# Patient Record
Sex: FEMALE | Race: ASIAN | NOT HISPANIC OR LATINO | Employment: FULL TIME | URBAN - METROPOLITAN AREA
[De-identification: names, ages, dates, MRNs, and addresses within clinical notes are randomized per-mention and may not be internally consistent; named-entity substitution may affect disease eponyms.]

---

## 2023-11-09 ENCOUNTER — TELEPHONE (OUTPATIENT)
Dept: OBSTETRICS AND GYNECOLOGY | Facility: CLINIC | Age: 28
End: 2023-11-09
Payer: OTHER GOVERNMENT

## 2023-11-09 NOTE — TELEPHONE ENCOUNTER
FW: Referral on 11/9/2023  Received: Today  Savannah Self, MD [facility] Staff  Needs ob appt     Someone else called patient and scheduled appointment.

## 2023-11-09 NOTE — TELEPHONE ENCOUNTER
Attempted to contact patient, no answer. Left patient voice mail to return call to clinic.    Regarding scheduling pt for new ob appointment.

## 2023-11-13 ENCOUNTER — PROCEDURE VISIT (OUTPATIENT)
Dept: OBSTETRICS AND GYNECOLOGY | Facility: CLINIC | Age: 28
End: 2023-11-13
Payer: OTHER GOVERNMENT

## 2023-11-13 ENCOUNTER — PATIENT MESSAGE (OUTPATIENT)
Dept: ADMINISTRATIVE | Facility: OTHER | Age: 28
End: 2023-11-13
Payer: OTHER GOVERNMENT

## 2023-11-13 VITALS
SYSTOLIC BLOOD PRESSURE: 100 MMHG | BODY MASS INDEX: 20.52 KG/M2 | WEIGHT: 108.69 LBS | DIASTOLIC BLOOD PRESSURE: 62 MMHG | HEIGHT: 61 IN

## 2023-11-13 DIAGNOSIS — Z32.01 POSITIVE PREGNANCY TEST: Primary | ICD-10-CM

## 2023-11-13 DIAGNOSIS — Z3A.17 17 WEEKS GESTATION OF PREGNANCY: ICD-10-CM

## 2023-11-13 DIAGNOSIS — Z36.2 ENCOUNTER FOR FOLLOW-UP ULTRASOUND OF FETAL ANATOMY: ICD-10-CM

## 2023-11-13 DIAGNOSIS — R31.9 HEMATURIA, UNSPECIFIED TYPE: ICD-10-CM

## 2023-11-13 DIAGNOSIS — Z32.01 POSITIVE PREGNANCY TEST: ICD-10-CM

## 2023-11-13 PROBLEM — O99.212 OBESITY AFFECTING PREGNANCY IN SECOND TRIMESTER: Status: ACTIVE | Noted: 2023-11-13

## 2023-11-13 LAB
B-HCG UR QL: POSITIVE
CTP QC/QA: YES

## 2023-11-13 PROCEDURE — 99213 OFFICE O/P EST LOW 20 MIN: CPT | Mod: PBBFAC,25 | Performed by: ADVANCED PRACTICE MIDWIFE

## 2023-11-13 PROCEDURE — 99999PBSHW POCT URINE PREGNANCY: ICD-10-PCS | Mod: PBBFAC,,,

## 2023-11-13 PROCEDURE — 76805 US OB/GYN PROCEDURE (VIEWPOINT): ICD-10-PCS | Mod: 26,S$PBB,, | Performed by: OBSTETRICS & GYNECOLOGY

## 2023-11-13 PROCEDURE — 87086 URINE CULTURE/COLONY COUNT: CPT | Performed by: ADVANCED PRACTICE MIDWIFE

## 2023-11-13 PROCEDURE — 76805 OB US >/= 14 WKS SNGL FETUS: CPT | Mod: PBBFAC | Performed by: OBSTETRICS & GYNECOLOGY

## 2023-11-13 PROCEDURE — 99999 PR PBB SHADOW E&M-EST. PATIENT-LVL III: ICD-10-PCS | Mod: PBBFAC,,, | Performed by: ADVANCED PRACTICE MIDWIFE

## 2023-11-13 PROCEDURE — 99999 PR PBB SHADOW E&M-EST. PATIENT-LVL III: CPT | Mod: PBBFAC,,, | Performed by: ADVANCED PRACTICE MIDWIFE

## 2023-11-13 PROCEDURE — 99999PBSHW POCT URINE PREGNANCY: Mod: PBBFAC,,,

## 2023-11-13 PROCEDURE — 81025 URINE PREGNANCY TEST: CPT | Mod: PBBFAC | Performed by: ADVANCED PRACTICE MIDWIFE

## 2023-11-13 RX ORDER — FERROUS SULFATE 325(65) MG
325 TABLET ORAL
COMMUNITY
Start: 2023-10-20 | End: 2023-12-08 | Stop reason: SDUPTHER

## 2023-11-13 RX ORDER — ASPIRIN 81 MG/1
81 TABLET ORAL
COMMUNITY
Start: 2023-10-20 | End: 2023-12-11

## 2023-11-13 RX ORDER — POTASSIUM CHLORIDE 20 MEQ/1
20 TABLET, EXTENDED RELEASE ORAL
COMMUNITY
Start: 2023-11-09 | End: 2023-12-11

## 2023-11-14 PROBLEM — R31.9 HEMATURIA: Status: ACTIVE | Noted: 2023-11-14

## 2023-11-14 NOTE — PROGRESS NOTES
27 y.o. female  at 17w5d here for initial OB visit  denies VB or cramping  Doing well without concerns   Flu vaccine received with PCP  A-Z book given and discussed  Delivery consents signed  [facility]  [facility] obtained and faxed    US reviewed, breech, ant placenta, 3 VC, Nl CLINT, some sub-op views remain, F/U scheduled        Positive pregnancy test  -     US OB/GYN Procedure (Viewpoint)-Future; Future  -     POCT Urine Pregnancy    17 weeks gestation of pregnancy  -     Connected MOM Enrollment  -     Assign Connected MOM Program Consent Questionnaire  -     CULTURE, URINE    Encounter for follow-up ultrasound of fetal anatomy  -     US OB/GYN Procedure (Viewpoint)-Future; Future    Hematuria, unspecified type  C&S obtained       Reviewed warning signs, pregnancy precautions and how/when to call.  RTC x 4 wks, call or present sooner prn.

## 2023-11-15 LAB
BACTERIA UR CULT: NORMAL
BACTERIA UR CULT: NORMAL

## 2023-12-04 NOTE — TELEPHONE ENCOUNTER
----- Message from Huma Gallo sent at 12/4/2023  3:24 PM CST -----  Contact: Ann Marie  Type:  RX Refill Request    Who Called:  Phasini  Refill or New Rx: refill   RX Name and Strength: Ferrous sulfate (FEOSOL) 325 mg (65 mg iron) Tab tablet  How is the patient currently taking it? (ex. 1XDay):1xday  Is this a 30 day or 90 day RX:   Preferred Pharmacy with phone number:   [facility]/pharmacy #[id number] - [place], [place] - [address] [address]  [address] [address] [address]  Phone: [phone] Fax: [phone]  Local or Mail Order: Local   Ordering Provider:Gracie Cordero   Would the patient rather a call back or a response via [facility]? Call back   Best Call Back Number: Please call her at  [phone]  Additional Information:

## 2023-12-06 RX ORDER — FERROUS SULFATE 325(65) MG
325 TABLET ORAL
OUTPATIENT
Start: 2023-12-06

## 2023-12-08 DIAGNOSIS — Z3A.17 17 WEEKS GESTATION OF PREGNANCY: Primary | ICD-10-CM

## 2023-12-08 RX ORDER — FERROUS SULFATE 325(65) MG
325 TABLET ORAL DAILY
Qty: 30 TABLET | Refills: 8 | Status: SHIPPED | OUTPATIENT
Start: 2023-12-08 | End: 2024-01-12 | Stop reason: SDUPTHER

## 2023-12-11 ENCOUNTER — PROCEDURE VISIT (OUTPATIENT)
Dept: OBSTETRICS AND GYNECOLOGY | Facility: CLINIC | Age: 28
End: 2023-12-11
Payer: OTHER GOVERNMENT

## 2023-12-11 ENCOUNTER — TELEPHONE (OUTPATIENT)
Dept: OBSTETRICS AND GYNECOLOGY | Facility: CLINIC | Age: 28
End: 2023-12-11

## 2023-12-11 VITALS
SYSTOLIC BLOOD PRESSURE: 110 MMHG | DIASTOLIC BLOOD PRESSURE: 66 MMHG | BODY MASS INDEX: 21.74 KG/M2 | WEIGHT: 115.06 LBS

## 2023-12-11 DIAGNOSIS — O43.192 MARGINAL INSERTION OF UMBILICAL CORD AFFECTING MANAGEMENT OF MOTHER IN SECOND TRIMESTER: Primary | ICD-10-CM

## 2023-12-11 DIAGNOSIS — Z36.2 ENCOUNTER FOR FOLLOW-UP ULTRASOUND OF FETAL ANATOMY: ICD-10-CM

## 2023-12-11 DIAGNOSIS — Z34.02 ENCOUNTER FOR SUPERVISION OF NORMAL FIRST PREGNANCY IN SECOND TRIMESTER: ICD-10-CM

## 2023-12-11 DIAGNOSIS — R31.9 HEMATURIA, UNSPECIFIED TYPE: ICD-10-CM

## 2023-12-11 PROBLEM — Z3A.17 17 WEEKS GESTATION OF PREGNANCY: Status: RESOLVED | Noted: 2023-11-13 | Resolved: 2023-12-11

## 2023-12-11 PROCEDURE — 99999 PR PBB SHADOW E&M-EST. PATIENT-LVL II: CPT | Mod: PBBFAC,,, | Performed by: ADVANCED PRACTICE MIDWIFE

## 2023-12-11 PROCEDURE — 0502F SUBSEQUENT PRENATAL CARE: CPT | Mod: ,,, | Performed by: ADVANCED PRACTICE MIDWIFE

## 2023-12-11 PROCEDURE — 0502F PR SUBSEQUENT PRENATAL CARE: ICD-10-PCS | Mod: ,,, | Performed by: ADVANCED PRACTICE MIDWIFE

## 2023-12-11 PROCEDURE — 99999 PR PBB SHADOW E&M-EST. PATIENT-LVL II: ICD-10-PCS | Mod: PBBFAC,,, | Performed by: ADVANCED PRACTICE MIDWIFE

## 2023-12-11 PROCEDURE — 76816 OB US FOLLOW-UP PER FETUS: CPT | Mod: PBBFAC | Performed by: OBSTETRICS & GYNECOLOGY

## 2023-12-11 PROCEDURE — 76816 US OB/GYN PROCEDURE (VIEWPOINT): ICD-10-PCS | Mod: 26,S$PBB,, | Performed by: OBSTETRICS & GYNECOLOGY

## 2023-12-11 PROCEDURE — 99212 OFFICE O/P EST SF 10 MIN: CPT | Mod: PBBFAC | Performed by: ADVANCED PRACTICE MIDWIFE

## 2023-12-11 NOTE — TELEPHONE ENCOUNTER
Called patient and advised message sent to  for breast pump order and we will need fax number to submit order.  Patient will call [facility] and get the fax number and call us back.

## 2023-12-11 NOTE — TELEPHONE ENCOUNTER
----- Message from Jaqueline Rich sent at 12/11/2023  3:50 PM CST -----  Contact: self  Pt is calling to give fax number to where order can be sent to for breast pump please fax to [phone] call back number is [phone] Thx CJ

## 2023-12-11 NOTE — PROGRESS NOTES
28 y.o. female  at 21w4d   Reports + FM, denies VB, LOF, or cramping  Doing well without concerns      TW lbs     Urine culture last visit was normal  Still have not received [facility] records from [facility] resent and staff to call. [facility] as [facility].    US today: cephalic, marginal placenta cord insertions noted, EFW 28%tile with normal AFV, anatomy appears completed. Will await M report.    Marginal insertion of umbilical cord affecting management of mother in second trimester  Growth 32 weeks    Encounter for supervision of normal first pregnancy in second trimester  Reviewed warning signs, normal FM,  labor precautions and how/when to call.  RTC x 4 wks, call or present sooner prn.

## 2023-12-11 NOTE — TELEPHONE ENCOUNTER
----- Message from Jordan Huang sent at 12/11/2023  1:25 PM CST -----  Contact: Patient  Ann Marie Jc would like a call back at [phone], in regards to getting an order for a breast pump submitted to [facility].

## 2023-12-24 ENCOUNTER — PATIENT MESSAGE (OUTPATIENT)
Dept: OBSTETRICS AND GYNECOLOGY | Facility: CLINIC | Age: 28
End: 2023-12-24
Payer: OTHER GOVERNMENT

## 2023-12-28 ENCOUNTER — PATIENT MESSAGE (OUTPATIENT)
Dept: OTHER | Facility: OTHER | Age: 28
End: 2023-12-28
Payer: OTHER GOVERNMENT

## 2024-01-10 ENCOUNTER — ROUTINE PRENATAL (OUTPATIENT)
Dept: OBSTETRICS AND GYNECOLOGY | Facility: CLINIC | Age: 29
End: 2024-01-10
Payer: OTHER GOVERNMENT

## 2024-01-10 ENCOUNTER — LAB VISIT (OUTPATIENT)
Dept: LAB | Facility: HOSPITAL | Age: 29
End: 2024-01-10
Attending: ADVANCED PRACTICE MIDWIFE
Payer: OTHER GOVERNMENT

## 2024-01-10 VITALS
DIASTOLIC BLOOD PRESSURE: 70 MMHG | SYSTOLIC BLOOD PRESSURE: 116 MMHG | BODY MASS INDEX: 23.58 KG/M2 | WEIGHT: 124.75 LBS

## 2024-01-10 DIAGNOSIS — O43.192 MARGINAL INSERTION OF UMBILICAL CORD AFFECTING MANAGEMENT OF MOTHER IN SECOND TRIMESTER: Primary | ICD-10-CM

## 2024-01-10 DIAGNOSIS — Z34.02 ENCOUNTER FOR SUPERVISION OF NORMAL FIRST PREGNANCY IN SECOND TRIMESTER: ICD-10-CM

## 2024-01-10 LAB
ABO + RH BLD: NORMAL
ANION GAP SERPL CALC-SCNC: 8 MMOL/L (ref 8–16)
BASOPHILS # BLD AUTO: 0.02 K/UL (ref 0–0.2)
BASOPHILS NFR BLD: 0.3 % (ref 0–1.9)
BLD GP AB SCN CELLS X3 SERPL QL: NORMAL
BUN SERPL-MCNC: 7 MG/DL (ref 6–20)
CALCIUM SERPL-MCNC: 8.7 MG/DL (ref 8.7–10.5)
CHLORIDE SERPL-SCNC: 108 MMOL/L (ref 95–110)
CO2 SERPL-SCNC: 20 MMOL/L (ref 23–29)
CREAT SERPL-MCNC: 0.6 MG/DL (ref 0.5–1.4)
DIFFERENTIAL METHOD BLD: ABNORMAL
EOSINOPHIL # BLD AUTO: 0 K/UL (ref 0–0.5)
EOSINOPHIL NFR BLD: 0.4 % (ref 0–8)
ERYTHROCYTE [DISTWIDTH] IN BLOOD BY AUTOMATED COUNT: 12.7 % (ref 11.5–14.5)
EST. GFR  (NO RACE VARIABLE): >60 ML/MIN/1.73 M^2
GLUCOSE SERPL-MCNC: 140 MG/DL (ref 70–140)
GLUCOSE SERPL-MCNC: 151 MG/DL (ref 70–110)
HAV IGM SERPL QL IA: NORMAL
HBV CORE IGM SERPL QL IA: NORMAL
HBV SURFACE AG SERPL QL IA: NORMAL
HCT VFR BLD AUTO: 33.9 % (ref 37–48.5)
HCV AB SERPL QL IA: NORMAL
HGB BLD-MCNC: 11.6 G/DL (ref 12–16)
HIV 1+2 AB+HIV1 P24 AG SERPL QL IA: NORMAL
IMM GRANULOCYTES # BLD AUTO: 0.06 K/UL (ref 0–0.04)
IMM GRANULOCYTES NFR BLD AUTO: 0.8 % (ref 0–0.5)
LYMPHOCYTES # BLD AUTO: 1.2 K/UL (ref 1–4.8)
LYMPHOCYTES NFR BLD: 17.1 % (ref 18–48)
MCH RBC QN AUTO: 32 PG (ref 27–31)
MCHC RBC AUTO-ENTMCNC: 34.2 G/DL (ref 32–36)
MCV RBC AUTO: 93 FL (ref 82–98)
MONOCYTES # BLD AUTO: 0.3 K/UL (ref 0.3–1)
MONOCYTES NFR BLD: 4.3 % (ref 4–15)
NEUTROPHILS # BLD AUTO: 5.6 K/UL (ref 1.8–7.7)
NEUTROPHILS NFR BLD: 77.1 % (ref 38–73)
NRBC BLD-RTO: 0 /100 WBC
PLATELET # BLD AUTO: 229 K/UL (ref 150–450)
PMV BLD AUTO: 9.7 FL (ref 9.2–12.9)
POTASSIUM SERPL-SCNC: 3.3 MMOL/L (ref 3.5–5.1)
RBC # BLD AUTO: 3.63 M/UL (ref 4–5.4)
SODIUM SERPL-SCNC: 136 MMOL/L (ref 136–145)
SPECIMEN OUTDATE: NORMAL
WBC # BLD AUTO: 7.2 K/UL (ref 3.9–12.7)

## 2024-01-10 PROCEDURE — 82950 GLUCOSE TEST: CPT | Performed by: ADVANCED PRACTICE MIDWIFE

## 2024-01-10 PROCEDURE — 85025 COMPLETE CBC W/AUTO DIFF WBC: CPT | Performed by: ADVANCED PRACTICE MIDWIFE

## 2024-01-10 PROCEDURE — 87491 CHLMYD TRACH DNA AMP PROBE: CPT | Performed by: ADVANCED PRACTICE MIDWIFE

## 2024-01-10 PROCEDURE — 86592 SYPHILIS TEST NON-TREP QUAL: CPT | Performed by: ADVANCED PRACTICE MIDWIFE

## 2024-01-10 PROCEDURE — 86762 RUBELLA ANTIBODY: CPT | Performed by: ADVANCED PRACTICE MIDWIFE

## 2024-01-10 PROCEDURE — 99213 OFFICE O/P EST LOW 20 MIN: CPT | Mod: PBBFAC | Performed by: ADVANCED PRACTICE MIDWIFE

## 2024-01-10 PROCEDURE — 80048 BASIC METABOLIC PNL TOTAL CA: CPT | Performed by: ADVANCED PRACTICE MIDWIFE

## 2024-01-10 PROCEDURE — 36415 COLL VENOUS BLD VENIPUNCTURE: CPT | Performed by: ADVANCED PRACTICE MIDWIFE

## 2024-01-10 PROCEDURE — 0502F SUBSEQUENT PRENATAL CARE: CPT | Mod: ,,, | Performed by: ADVANCED PRACTICE MIDWIFE

## 2024-01-10 PROCEDURE — 99999 PR PBB SHADOW E&M-EST. PATIENT-LVL III: CPT | Mod: PBBFAC,,, | Performed by: ADVANCED PRACTICE MIDWIFE

## 2024-01-10 PROCEDURE — 87086 URINE CULTURE/COLONY COUNT: CPT | Performed by: ADVANCED PRACTICE MIDWIFE

## 2024-01-10 PROCEDURE — 87389 HIV-1 AG W/HIV-1&-2 AB AG IA: CPT | Performed by: ADVANCED PRACTICE MIDWIFE

## 2024-01-10 PROCEDURE — 80074 ACUTE HEPATITIS PANEL: CPT | Performed by: ADVANCED PRACTICE MIDWIFE

## 2024-01-10 PROCEDURE — 86901 BLOOD TYPING SEROLOGIC RH(D): CPT | Performed by: ADVANCED PRACTICE MIDWIFE

## 2024-01-10 NOTE — PATIENT INSTRUCTIONS
"  Frequently Asked Questions for Pregnant Women Concerning Tdap Vaccination    What is pertussis (whooping cough)?  Pertussis (also called whooping cough) is a highly contagious disease that causes severe coughing. People with pertussis may make a "whooping" sound when they try to breathe and gasp for air. In newborns (birth to 1 month), pertussis can be life threatening. Recent outbreaks have shown that infants younger than 3 months are at very high risk of severe infection.     What is Tdap?  The tetanus toxoid, reduced diphtheria toxoid, and acellular pertussis (Tdap) vaccine is used to prevent three infections: 1) tetanus, 2) diptheria, and 3) pertussis.    I am pregnant. Should I get a Tdap shot?  Yes. All pregnant women should get a Tdap shot in the 3rd trimester, preferably between 27 weeks and 36 weeks of pregnancy. The Tdap shot is an effective and safe way to protect you and your baby from serious illness and complications of pertussis. You should get a Tdap shot during each pregnancy.    Is it safe to get the Tdap shot during pregnancy?  Yes. There are no theoretical or proven concerns about the safety of the Tdap vaccine (or other inactivated vaccines like Tdap) during pregnancy. The shot is safe when given to pregnant women.     During which trimester is it safe to get a Tdap shot?  It is safe to get the Tdap shot during all three trimesters of pregnancy. Experts recommend that you get Tdap during the 3rd trimester (preferably between 27 weeks and 36 weeks of pregnancy). This gives your  the most protection. The shot causes you to make antibodies against pertussis. These antibiotics are passed to the fetus. They protect your  until he or she begins to get vaccines against pertussis at 2 months of age.    Can newborns be vaccinated against pertussis?  No. Newborns cannot start their vaccine series against pertussis until they are 2 months of age because the vaccine does not work in the " "first few weeks of life. This is partly why newborns are at a higher risk of getting pertussis and becoming very ill.    What else can I do to protect my baby against pertussis?  Getting your Tdap shot is the most important step in protecting yourself and your baby against pertussis. It also is important that all family members and caregivers are up-to-date with their vaccines. If they need the Tdap shot, they should get it at least 2 weeks before having contact with your . This makes a safety "cocoon" of vaccinated caregivers around your baby.    I am breastfeeding my baby. Is it safe to get the Tdap vaccine?  Yes. The Tdap shot can safely be given to breastfeeding women if they did not get the Tdap shot during pregnancy and have never received the Tdap shot before.    I did not get my Tdap shot during pregnancy. Do I still need to get the vaccine?  If you have never gotten the Tdap vaccine and you do not get the shot during pregnancy, be sure to get the vaccine right after you give birth, before you leave the hospital or birthing center. It will take about 2 weeks for your body to make protective antibodies in response to the vaccine. Once these antibodies are made, you are likely to give pertussis to your . But remember, your baby still will be at risk of catching whooping cough from others.    I got a Tdap shot during a past pregnancy. Do I need to get the shot again during this pregnancy?  Yes. All pregnant women should get a Tdap shot during each pregnancy, preferably between 27 weeks and 36 weeks of pregnancy. This time frame is recommended because it gives the most protection to the pregnant woman and the fetus. It appears to maximize the antibodies present in the  at birth.    I received a Tdap shot early in this pregnancy, before 27-36 weeks of pregnancy. Do I need to get another Tdap shot between 27 weeks and 36 weeks of pregnancy?  A pregnant woman does not need to get the Tdap shot " later in the same pregnancy if she got the shot in the first or second trimester.     Can I get the Tdap vaccine and flu vaccine at the same time?  Yes. You can get more than one vaccine in the same visit.    What is the difference between Tdap, Td, and DTaP?  Children receive the diphtheria and tetanus toxoids and acellular pertussis (DTaP) vaccine. Teenagers and adults are given the Tdap vaccine as a booster to the DTaP they got as children. Adults receive the tetanus and diphtheria (Td) vaccine every 10 years to protect against tetanus and diphtheria. Td does not protect against pertussis.     RESOURCES  www.acog.org  www.immunizationforwomen.org  https://www.cdc.gov/vaccines/pregnancy/index.html  www.[facility].org

## 2024-01-10 NOTE — PROGRESS NOTES
28 y.o. female  at 25w6d   Reports + FM, denies VB, LOF or CTX  Had episode of bleeding after intercourse that resolved. Reassured patient.  Also had episode of feeling like she is going to pass out. Encouraged to stay hydrated, eat regularly, don't stand up too fast and let us know if it continues. Will check for anemia today.    TW lbs   3T labs today as well as full prenatal panel due to inability to get records from [facility] [place]    Tdap discussed  Will start visits every 2 weeks    Marginal insertion of umbilical cord affecting management of mother in second trimester  -     US OB/GYN Procedure (Viewpoint); Future; Expected date: 02/10/2024    Encounter for supervision of normal first pregnancy in second trimester  -     HIV 1/2 Ag/Ab (4th Gen); Future; Expected date: 01/10/2024  -     RPR; Future; Expected date: 01/10/2024  -     Type & Screen; Future; Expected date: 01/10/2024  -     Rubella antibody, IgG; Future; Expected date: 01/10/2024  -     Urine culture  -     CBC auto differential; Future; Expected date: 01/10/2024  -     Basic metabolic panel; Future; Expected date: 01/10/2024  -     OB Glucose Screen; Future; Expected date: 01/10/2024  -     Hepatitis Panel, Acute; Future; Expected date: 01/10/2024  -     C. trachomatis/N. gonorrhoeae by [facility]; Urine    Encounter for lactation counseling  -     BREAST PUMP FOR HOME USE         Reviewed warning signs, normal FKCs,  labor precautions and how/when to call.  RTC x 2 wks, call or present sooner prn.

## 2024-01-11 ENCOUNTER — PATIENT MESSAGE (OUTPATIENT)
Dept: OTHER | Facility: OTHER | Age: 29
End: 2024-01-11
Payer: OTHER GOVERNMENT

## 2024-01-11 LAB
RPR SER QL: NORMAL
RUBV IGG SER-ACNC: 12.1 IU/ML
RUBV IGG SER-IMP: REACTIVE

## 2024-01-12 ENCOUNTER — PATIENT MESSAGE (OUTPATIENT)
Dept: OBSTETRICS AND GYNECOLOGY | Facility: CLINIC | Age: 29
End: 2024-01-12
Payer: OTHER GOVERNMENT

## 2024-01-12 DIAGNOSIS — Z3A.17 17 WEEKS GESTATION OF PREGNANCY: ICD-10-CM

## 2024-01-12 LAB
BACTERIA UR CULT: NO GROWTH
C TRACH DNA SPEC QL NAA+PROBE: NOT DETECTED
N GONORRHOEA DNA SPEC QL NAA+PROBE: NOT DETECTED

## 2024-01-12 NOTE — TELEPHONE ENCOUNTER
Refill Routing Note   Medication(s) are not appropriate for processing by [facility] for the following reason(s):        Non-participating provider    [facility] action(s):  Route               Appointments  past 12m or future 3m with PCP    Date Provider   Last Visit   Visit date not found David Kirby CNM   Next Visit   Visit date not found David Kirby CNM   ED visits in past 90 days: 0        Note composed:8:24 AM 01/12/2024

## 2024-01-16 RX ORDER — FERROUS SULFATE 325(65) MG
325 TABLET ORAL DAILY
Qty: 30 TABLET | Refills: 8 | Status: SHIPPED | OUTPATIENT
Start: 2024-01-16 | End: 2024-02-26 | Stop reason: SDUPTHER

## 2024-01-24 DIAGNOSIS — O99.012 ANEMIA DURING PREGNANCY IN SECOND TRIMESTER: ICD-10-CM

## 2024-01-24 DIAGNOSIS — E87.6 HYPOKALEMIA: Primary | ICD-10-CM

## 2024-01-24 RX ORDER — POTASSIUM CHLORIDE 20 MEQ/1
20 TABLET, EXTENDED RELEASE ORAL DAILY
Qty: 14 TABLET | Refills: 0 | Status: SHIPPED | OUTPATIENT
Start: 2024-01-24 | End: 2024-02-07

## 2024-01-25 ENCOUNTER — PATIENT MESSAGE (OUTPATIENT)
Dept: OTHER | Facility: OTHER | Age: 29
End: 2024-01-25
Payer: OTHER GOVERNMENT

## 2024-01-29 ENCOUNTER — ROUTINE PRENATAL (OUTPATIENT)
Dept: OBSTETRICS AND GYNECOLOGY | Facility: CLINIC | Age: 29
End: 2024-01-29
Payer: OTHER GOVERNMENT

## 2024-01-29 VITALS
DIASTOLIC BLOOD PRESSURE: 62 MMHG | BODY MASS INDEX: 23.95 KG/M2 | SYSTOLIC BLOOD PRESSURE: 100 MMHG | WEIGHT: 126.75 LBS

## 2024-01-29 DIAGNOSIS — O43.192 MARGINAL INSERTION OF UMBILICAL CORD AFFECTING MANAGEMENT OF MOTHER IN SECOND TRIMESTER: Primary | ICD-10-CM

## 2024-01-29 DIAGNOSIS — Z23 NEED FOR TDAP VACCINATION: ICD-10-CM

## 2024-01-29 DIAGNOSIS — O99.013 ANEMIA DURING PREGNANCY IN THIRD TRIMESTER: ICD-10-CM

## 2024-01-29 PROCEDURE — 0502F SUBSEQUENT PRENATAL CARE: CPT | Mod: ,,, | Performed by: ADVANCED PRACTICE MIDWIFE

## 2024-01-29 PROCEDURE — 90471 IMMUNIZATION ADMIN: CPT | Mod: PBBFAC

## 2024-01-29 PROCEDURE — 99999PBSHW TDAP VACCINE GREATER THAN OR EQUAL TO 7YO IM: Mod: PBBFAC,,,

## 2024-01-29 PROCEDURE — 99213 OFFICE O/P EST LOW 20 MIN: CPT | Mod: 25,PBBFAC | Performed by: ADVANCED PRACTICE MIDWIFE

## 2024-01-29 PROCEDURE — 99999 PR PBB SHADOW E&M-EST. PATIENT-LVL III: CPT | Mod: PBBFAC,,, | Performed by: ADVANCED PRACTICE MIDWIFE

## 2024-01-29 NOTE — PROGRESS NOTES
28 y.o. female  at 28w4d   Reports + FM, denies VB, LOF or CTX  Doing well without concerns, all questions answered today.   Had a migraine last week- recommended Excedrin, hydration, and discussed if she has increased frequency of migraines to let us know.     TW lbs   28wk labs done.   Anemia: NA  Passed GTT      Birth control options discussed- patient opting for natural family planning. Discussed temperature tracking, cervical mucus tracking, apps and other methods.   Will start visits every 2 weeks    Marginal insertion of umbilical cord affecting management of mother in second trimester    Anemia during pregnancy in third trimester         Reviewed warning signs, normal FKCs, third trimester expectations,  labor precautions and how/when to call.  RTC x 2 wks, call or present sooner alonzon.      CHICHI WILSON

## 2024-01-29 NOTE — NURSING
Tdap immunization administered to left deltoid. Patient tolerated well. No pain or discomfort noted. Advised to wait 15 minutes in clinic after injection administration. Patient verbalized understanding.

## 2024-02-08 ENCOUNTER — PATIENT MESSAGE (OUTPATIENT)
Dept: OTHER | Facility: OTHER | Age: 29
End: 2024-02-08
Payer: OTHER GOVERNMENT

## 2024-02-15 ENCOUNTER — ROUTINE PRENATAL (OUTPATIENT)
Dept: OBSTETRICS AND GYNECOLOGY | Facility: CLINIC | Age: 29
End: 2024-02-15
Payer: OTHER GOVERNMENT

## 2024-02-15 VITALS
DIASTOLIC BLOOD PRESSURE: 74 MMHG | BODY MASS INDEX: 24.45 KG/M2 | WEIGHT: 129.44 LBS | SYSTOLIC BLOOD PRESSURE: 104 MMHG

## 2024-02-15 DIAGNOSIS — O43.193 MARGINAL INSERTION OF UMBILICAL CORD AFFECTING MANAGEMENT OF MOTHER IN THIRD TRIMESTER: Primary | ICD-10-CM

## 2024-02-15 DIAGNOSIS — E87.6 HYPOKALEMIA: ICD-10-CM

## 2024-02-15 DIAGNOSIS — O99.013 ANEMIA DURING PREGNANCY IN THIRD TRIMESTER: ICD-10-CM

## 2024-02-15 PROCEDURE — 0502F SUBSEQUENT PRENATAL CARE: CPT | Mod: ,,, | Performed by: ADVANCED PRACTICE MIDWIFE

## 2024-02-15 PROCEDURE — 99999 PR PBB SHADOW E&M-EST. PATIENT-LVL II: CPT | Mod: PBBFAC,,, | Performed by: ADVANCED PRACTICE MIDWIFE

## 2024-02-15 PROCEDURE — 99212 OFFICE O/P EST SF 10 MIN: CPT | Mod: PBBFAC | Performed by: ADVANCED PRACTICE MIDWIFE

## 2024-02-15 NOTE — PROGRESS NOTES
28 y.o. female  at 31w0d   Reports + FM, denies VB, LOF or CTX  Doing well without concerns     TW lbs   Reviewed 28wk lab results (B POS)  Passed GTT  Tdap given     Marginal insertion of umbilical cord affecting management of mother in third trimester  Growth US next week    Anemia during pregnancy in third trimester  Iron daily    Hypokalemia  3.3, took supplement for 2 weeks       Reviewed warning signs, normal FKCs,  labor precautions and how/when to call.  RTC x 2 wks, call or present sooner prn.

## 2024-02-22 ENCOUNTER — PATIENT MESSAGE (OUTPATIENT)
Dept: OTHER | Facility: OTHER | Age: 29
End: 2024-02-22
Payer: OTHER GOVERNMENT

## 2024-02-23 ENCOUNTER — ROUTINE PRENATAL (OUTPATIENT)
Dept: OBSTETRICS AND GYNECOLOGY | Facility: CLINIC | Age: 29
End: 2024-02-23
Payer: OTHER GOVERNMENT

## 2024-02-23 VITALS — WEIGHT: 131.81 LBS | SYSTOLIC BLOOD PRESSURE: 98 MMHG | DIASTOLIC BLOOD PRESSURE: 60 MMHG | BODY MASS INDEX: 24.91 KG/M2

## 2024-02-23 DIAGNOSIS — O99.013 ANEMIA DURING PREGNANCY IN THIRD TRIMESTER: ICD-10-CM

## 2024-02-23 DIAGNOSIS — Z36.89 ENCOUNTER FOR ULTRASOUND TO ASSESS FETAL GROWTH: Primary | ICD-10-CM

## 2024-02-23 DIAGNOSIS — Z3A.32 32 WEEKS GESTATION OF PREGNANCY: ICD-10-CM

## 2024-02-23 PROBLEM — O43.192 MARGINAL INSERTION OF UMBILICAL CORD AFFECTING MANAGEMENT OF MOTHER IN SECOND TRIMESTER: Status: RESOLVED | Noted: 2023-12-11 | Resolved: 2024-02-23

## 2024-02-23 PROCEDURE — 0502F SUBSEQUENT PRENATAL CARE: CPT | Mod: ,,, | Performed by: ADVANCED PRACTICE MIDWIFE

## 2024-02-23 PROCEDURE — 99212 OFFICE O/P EST SF 10 MIN: CPT | Mod: PBBFAC | Performed by: ADVANCED PRACTICE MIDWIFE

## 2024-02-23 PROCEDURE — 99999 PR PBB SHADOW E&M-EST. PATIENT-LVL II: CPT | Mod: PBBFAC,,, | Performed by: ADVANCED PRACTICE MIDWIFE

## 2024-02-26 DIAGNOSIS — Z3A.17 17 WEEKS GESTATION OF PREGNANCY: ICD-10-CM

## 2024-02-26 RX ORDER — FERROUS SULFATE 325(65) MG
325 TABLET ORAL DAILY
Qty: 30 TABLET | Refills: 8 | Status: SHIPPED | OUTPATIENT
Start: 2024-02-26 | End: 2024-04-01 | Stop reason: SDUPTHER

## 2024-03-01 NOTE — PROGRESS NOTES
28 y.o. female  at 33w1d   Reports + FM, denies VB, LOF or CTX  Doing well without concerns     TW lbs   Reviewed 28wk lab results (B POS)  Anemia on iron supplement  Tdap done    Encounter for ultrasound to assess fetal growth  -     US OB/GYN Procedure (Viewpoint)-Future; Future    32 weeks gestation of pregnancy    Anemia during pregnancy in third trimester     Reviewed warning signs, normal FKCs,  labor precautions and how/when to call.  RTC x 2 wks, call or present sooner prn.

## 2024-03-06 ENCOUNTER — ROUTINE PRENATAL (OUTPATIENT)
Dept: OBSTETRICS AND GYNECOLOGY | Facility: CLINIC | Age: 29
End: 2024-03-06
Payer: OTHER GOVERNMENT

## 2024-03-06 VITALS — SYSTOLIC BLOOD PRESSURE: 130 MMHG | BODY MASS INDEX: 25.03 KG/M2 | WEIGHT: 132.5 LBS | DIASTOLIC BLOOD PRESSURE: 70 MMHG

## 2024-03-06 DIAGNOSIS — Z3A.34 34 WEEKS GESTATION OF PREGNANCY: ICD-10-CM

## 2024-03-06 DIAGNOSIS — Z36.89 ENCOUNTER FOR ULTRASOUND TO ASSESS FETAL GROWTH: Primary | ICD-10-CM

## 2024-03-06 PROCEDURE — 99999 PR PBB SHADOW E&M-EST. PATIENT-LVL III: CPT | Mod: PBBFAC,,, | Performed by: ADVANCED PRACTICE MIDWIFE

## 2024-03-06 PROCEDURE — 99213 OFFICE O/P EST LOW 20 MIN: CPT | Mod: PBBFAC | Performed by: ADVANCED PRACTICE MIDWIFE

## 2024-03-06 PROCEDURE — 0502F SUBSEQUENT PRENATAL CARE: CPT | Mod: ,,, | Performed by: ADVANCED PRACTICE MIDWIFE

## 2024-03-06 NOTE — PROGRESS NOTES
28 y.o. female  at 33w6d   Reports + FM, denies VB, LOF or CTX  Doing well without concerns   Discussed birthing wishes    Encounter for ultrasound to assess fetal growth  -     US OB/GYN Procedure (Viewpoint)-Future; Future; Expected date: 2024    34 weeks gestation of pregnancy     Reviewed warning signs, normal FKCs,  labor precautions and how/when to call.  RTC x 2 wks, call or present sooner prn.

## 2024-03-14 ENCOUNTER — PATIENT MESSAGE (OUTPATIENT)
Dept: OTHER | Facility: OTHER | Age: 29
End: 2024-03-14
Payer: OTHER GOVERNMENT

## 2024-03-19 ENCOUNTER — PROCEDURE VISIT (OUTPATIENT)
Dept: OBSTETRICS AND GYNECOLOGY | Facility: CLINIC | Age: 29
End: 2024-03-19
Payer: OTHER GOVERNMENT

## 2024-03-19 VITALS
WEIGHT: 135.38 LBS | DIASTOLIC BLOOD PRESSURE: 77 MMHG | BODY MASS INDEX: 25.58 KG/M2 | SYSTOLIC BLOOD PRESSURE: 108 MMHG

## 2024-03-19 DIAGNOSIS — Z36.89 ENCOUNTER FOR ULTRASOUND TO ASSESS FETAL GROWTH: ICD-10-CM

## 2024-03-19 DIAGNOSIS — O99.013 ANEMIA DURING PREGNANCY IN THIRD TRIMESTER: Primary | ICD-10-CM

## 2024-03-19 PROBLEM — Z3A.34 34 WEEKS GESTATION OF PREGNANCY: Status: RESOLVED | Noted: 2024-02-23 | Resolved: 2024-03-19

## 2024-03-19 PROCEDURE — 99999 PR PBB SHADOW E&M-EST. PATIENT-LVL III: CPT | Mod: PBBFAC,,, | Performed by: ADVANCED PRACTICE MIDWIFE

## 2024-03-19 PROCEDURE — 76816 OB US FOLLOW-UP PER FETUS: CPT | Mod: PBBFAC | Performed by: OBSTETRICS & GYNECOLOGY

## 2024-03-19 PROCEDURE — 0502F SUBSEQUENT PRENATAL CARE: CPT | Mod: ,,, | Performed by: ADVANCED PRACTICE MIDWIFE

## 2024-03-19 PROCEDURE — 99213 OFFICE O/P EST LOW 20 MIN: CPT | Mod: PBBFAC | Performed by: ADVANCED PRACTICE MIDWIFE

## 2024-03-19 NOTE — PROGRESS NOTES
28 y.o. female  at 35w5d  Reports + FM, denies VB, LOF or regular CTX  Doing well without concerns, some rib pain, suggested magnesium supplement and warm baths.     TW lbs   GBS NV    Growth ultrasound today: EFW 14%ile, AC 19%ile. MVP 6.5cm, fluid normal.     Anemia during pregnancy in third trimester       Reviewed warning signs, normal FKCs, labor precautions and how/when to call.  RTC x 1 wk, call or present sooner prn.     STEVE Farias

## 2024-03-28 ENCOUNTER — ROUTINE PRENATAL (OUTPATIENT)
Dept: OBSTETRICS AND GYNECOLOGY | Facility: CLINIC | Age: 29
End: 2024-03-28
Payer: OTHER GOVERNMENT

## 2024-03-28 VITALS
SYSTOLIC BLOOD PRESSURE: 110 MMHG | WEIGHT: 137.56 LBS | BODY MASS INDEX: 25.99 KG/M2 | DIASTOLIC BLOOD PRESSURE: 74 MMHG

## 2024-03-28 DIAGNOSIS — O99.013 ANEMIA DURING PREGNANCY IN THIRD TRIMESTER: Primary | ICD-10-CM

## 2024-03-28 PROCEDURE — 99999 PR PBB SHADOW E&M-EST. PATIENT-LVL II: CPT | Mod: PBBFAC,,, | Performed by: MIDWIFE

## 2024-03-28 PROCEDURE — 0502F SUBSEQUENT PRENATAL CARE: CPT | Mod: ,,, | Performed by: MIDWIFE

## 2024-03-28 PROCEDURE — 99212 OFFICE O/P EST SF 10 MIN: CPT | Mod: PBBFAC | Performed by: MIDWIFE

## 2024-03-28 PROCEDURE — 87081 CULTURE SCREEN ONLY: CPT | Performed by: MIDWIFE

## 2024-03-28 NOTE — PROGRESS NOTES
28 y.o. female  at 37w0d  Reports + FM, denies VB, LOF or regular CTX  Doing well without concerns. Feeling more pressure and occasional pelvic pains.   States she is taking a Lamaze class Saturday :)  Birth plan reviewed and will upload to media   TW.5 lbs     Anemia during pregnancy in third trimester        - daily iron        - continue routine PNC  -GBS collected today. The skin of the suprapubic region was evaluated and appears clean, dry, and intact.    -declines VE    Reviewed warning signs, normal FKCs, labor precautions and how/when to call.  RTC x 1 wk, call or present sooner prn.

## 2024-04-01 DIAGNOSIS — Z3A.17 17 WEEKS GESTATION OF PREGNANCY: ICD-10-CM

## 2024-04-01 LAB — BACTERIA SPEC AEROBE CULT: NORMAL

## 2024-04-04 ENCOUNTER — ROUTINE PRENATAL (OUTPATIENT)
Dept: OBSTETRICS AND GYNECOLOGY | Facility: CLINIC | Age: 29
End: 2024-04-04
Payer: OTHER GOVERNMENT

## 2024-04-04 VITALS
SYSTOLIC BLOOD PRESSURE: 110 MMHG | WEIGHT: 136.88 LBS | DIASTOLIC BLOOD PRESSURE: 72 MMHG | BODY MASS INDEX: 25.87 KG/M2

## 2024-04-04 DIAGNOSIS — O99.013 ANEMIA DURING PREGNANCY IN THIRD TRIMESTER: Primary | ICD-10-CM

## 2024-04-04 PROCEDURE — 0502F SUBSEQUENT PRENATAL CARE: CPT | Mod: ,,, | Performed by: ADVANCED PRACTICE MIDWIFE

## 2024-04-04 PROCEDURE — 99999 PR PBB SHADOW E&M-EST. PATIENT-LVL III: CPT | Mod: PBBFAC,,, | Performed by: ADVANCED PRACTICE MIDWIFE

## 2024-04-04 PROCEDURE — 99213 OFFICE O/P EST LOW 20 MIN: CPT | Mod: PBBFAC | Performed by: ADVANCED PRACTICE MIDWIFE

## 2024-04-04 RX ORDER — FERROUS SULFATE 325(65) MG
325 TABLET ORAL DAILY
Qty: 30 TABLET | Refills: 8 | Status: ON HOLD | OUTPATIENT
Start: 2024-04-04 | End: 2024-04-20 | Stop reason: HOSPADM

## 2024-04-04 NOTE — PROGRESS NOTES
28 y.o. female  at 38w0d   Reports + FM, denies VB, LOF or regular CTX  Doing well without concerns. Had a period of stronger contractions early this am, but they stopped.   Wants tour of L&D, number given.     Cervix soft, midposition.     TW lbs     Anemia during pregnancy in third trimester         Reviewed warning signs, normal FKCs, labor precautions and how/when to call.  RTC x 1 wk, call or present sooner prn.

## 2024-04-08 ENCOUNTER — PATIENT MESSAGE (OUTPATIENT)
Dept: OBSTETRICS AND GYNECOLOGY | Facility: CLINIC | Age: 29
End: 2024-04-08

## 2024-04-08 ENCOUNTER — ROUTINE PRENATAL (OUTPATIENT)
Dept: OBSTETRICS AND GYNECOLOGY | Facility: CLINIC | Age: 29
End: 2024-04-08
Payer: OTHER GOVERNMENT

## 2024-04-08 VITALS
SYSTOLIC BLOOD PRESSURE: 116 MMHG | BODY MASS INDEX: 25.62 KG/M2 | WEIGHT: 135.56 LBS | DIASTOLIC BLOOD PRESSURE: 72 MMHG

## 2024-04-08 DIAGNOSIS — E87.6 HYPOKALEMIA: ICD-10-CM

## 2024-04-08 DIAGNOSIS — O99.013 ANEMIA DURING PREGNANCY IN THIRD TRIMESTER: Primary | ICD-10-CM

## 2024-04-08 PROCEDURE — 99999 PR PBB SHADOW E&M-EST. PATIENT-LVL II: CPT | Mod: PBBFAC,,, | Performed by: MIDWIFE

## 2024-04-08 PROCEDURE — 0502F SUBSEQUENT PRENATAL CARE: CPT | Mod: ,,, | Performed by: MIDWIFE

## 2024-04-08 PROCEDURE — 99212 OFFICE O/P EST SF 10 MIN: CPT | Mod: PBBFAC | Performed by: MIDWIFE

## 2024-04-08 NOTE — PROGRESS NOTES
28 y.o. female  at 38w4d   Reports + FM, denies VB, LOF or regular CTX  Doing well without concerns     TW lbs     Anemia during pregnancy in third trimester    Hypokalemia    SVE per request   Reviewed GBS neg results  Reviewed warning signs, normal FKCs, labor precautions and how/when to call.  RTC x 1 wk, call or present sooner prn.      STEVE Carmona

## 2024-04-14 ENCOUNTER — HOSPITAL ENCOUNTER (OUTPATIENT)
Facility: HOSPITAL | Age: 29
Discharge: HOME | End: 2024-04-14
Attending: OBSTETRICS & GYNECOLOGY | Admitting: OBSTETRICS & GYNECOLOGY
Payer: OTHER GOVERNMENT

## 2024-04-14 VITALS — WEIGHT: 135 LBS | BODY MASS INDEX: 25.49 KG/M2 | RESPIRATION RATE: 18 BRPM | TEMPERATURE: 98 F | HEIGHT: 61 IN

## 2024-04-14 DIAGNOSIS — O47.9 THREATENED LABOR AT TERM: ICD-10-CM

## 2024-04-14 PROCEDURE — 99211 OFF/OP EST MAY X REQ PHY/QHP: CPT | Mod: 25

## 2024-04-14 PROCEDURE — 59025 FETAL NON-STRESS TEST: CPT

## 2024-04-14 PROCEDURE — 87210 SMEAR WET MOUNT SALINE/INK: CPT | Mod: QW,,, | Performed by: ADVANCED PRACTICE MIDWIFE

## 2024-04-14 PROCEDURE — 99214 OFFICE O/P EST MOD 30 MIN: CPT | Mod: 25,,, | Performed by: ADVANCED PRACTICE MIDWIFE

## 2024-04-14 PROCEDURE — 59025 FETAL NON-STRESS TEST: CPT | Mod: 26,,, | Performed by: ADVANCED PRACTICE MIDWIFE

## 2024-04-14 PROCEDURE — 63600175 PHARM REV CODE 636 W HCPCS: Performed by: ADVANCED PRACTICE MIDWIFE

## 2024-04-14 RX ORDER — MORPHINE SULFATE 10 MG/ML
10 INJECTION INTRAMUSCULAR; INTRAVENOUS; SUBCUTANEOUS ONCE
Status: COMPLETED | OUTPATIENT
Start: 2024-04-14 | End: 2024-04-14

## 2024-04-14 RX ORDER — HYDROXYZINE HYDROCHLORIDE 25 MG/ML
25 INJECTION, SOLUTION INTRAMUSCULAR ONCE
Status: COMPLETED | OUTPATIENT
Start: 2024-04-14 | End: 2024-04-14

## 2024-04-14 RX ORDER — ACETAMINOPHEN 500 MG
500 TABLET ORAL EVERY 6 HOURS PRN
Status: DISCONTINUED | OUTPATIENT
Start: 2024-04-14 | End: 2024-04-14 | Stop reason: HOSPADM

## 2024-04-14 RX ORDER — ONDANSETRON 8 MG/1
8 TABLET, ORALLY DISINTEGRATING ORAL EVERY 8 HOURS PRN
Status: DISCONTINUED | OUTPATIENT
Start: 2024-04-14 | End: 2024-04-14 | Stop reason: HOSPADM

## 2024-04-14 RX ADMIN — MORPHINE SULFATE 10 MG: 10 INJECTION, SOLUTION INTRAMUSCULAR; INTRAVENOUS at 11:04

## 2024-04-14 RX ADMIN — HYDROXYZINE HYDROCHLORIDE 25 MG: 25 INJECTION, SOLUTION INTRAMUSCULAR at 11:04

## 2024-04-14 NOTE — PROCEDURES
Ann Marie Carolina is a 28 y.o. female patient.            Obtain Fetal nonstress test (NST)    Date/Time: 4/14/2024 11:11 AM    Performed by: Deidra Rayo CNM  Authorized by: Deidra Rayo CNM    Nonstress Test:     Variability:  6-25 BPM    Decelerations:  None    Accelerations:  15 bpm    Acoustic Stimulator: No      Baseline:  125    Uterine Irritability: No      Contractions:  Regular    Contraction Frequency:  4-6  Biophysical Profile:     Nonstress Test Interpretation: reactive      Overall Impression:  Reassuring  Post-procedure:     Patient tolerance:  Patient tolerated the procedure well with no immediate complications      4/14/2024

## 2024-04-14 NOTE — HPI
27yo  EGA 39w3d  presents to LD with c/o back pain overnight and possible leaking fluid. Denies feeling contractions in her abdomen, denies VB. Reports good fetal movement.

## 2024-04-14 NOTE — SUBJECTIVE & OBJECTIVE
Obstetric HPI:  This pregnancy has been complicated by   anemia    OB History    Para Term  AB Living   1 0 0 0 0 0   SAB IAB Ectopic Multiple Live Births   0 0 0 0 0      # Outcome Date GA Lbr Jay/2nd Weight Sex Type Anes PTL Lv   1 Current              No past medical history on file.  No past surgical history on file.    Current Facility-Administered Medications   Medication Dose Route Frequency Provider Last Rate Last Admin    hydrOXYzine injection 25 mg  25 mg Intramuscular Once Deidra Rayo CNM        morphine injection 10 mg  10 mg Intramuscular Once Deidra Rayo CNM           Review of patient's allergies indicates:   Allergen Reactions    Iodinated contrast media         Family History    None       Tobacco Use    Smoking status: Never     Passive exposure: Never    Smokeless tobacco: Never   Substance and Sexual Activity    Alcohol use: Not Currently    Drug use: Never    Sexual activity: Yes     Partners: Male     Review of Systems   Gastrointestinal:  Negative for abdominal pain.   Genitourinary:  Positive for vaginal discharge (had leaking  fluid around 0530 this morning when she woke up, reports she did have intercourse last night). Negative for vaginal bleeding.   Musculoskeletal:  Positive for back pain.      Objective:     Vital Signs (Most Recent):    Vital Signs (24h Range):           There is no height or weight on file to calculate BMI.    FHT: 125 Cat 1 (reassuring)  TOCO:  Q 4-6 minutes     Physical Exam:   Constitutional: She is oriented to person, place, and time. Vital signs are normal. She appears well-developed and well-nourished. She is cooperative.    HENT:   Head: Normocephalic.      Cardiovascular:  Normal rate, regular rhythm and normal heart sounds.             Pulmonary/Chest: Effort normal.        Abdominal: Soft.   Gravid, non-tender     Genitourinary:    Vagina and uterus normal.      Genitourinary Comments: Speculum exam shows negative pooling,  negative nitrazine, negative ferning, wet prep negative             Musculoskeletal: Normal range of motion and moves all extremeties.       Neurological: She is alert and oriented to person, place, and time. She has normal strength and normal reflexes.    Skin: Skin is warm and dry. Capillary refill takes less than 2 seconds.    Psychiatric: She has a normal mood and affect. Her speech is normal and behavior is normal. Judgment and thought content normal.        Cervix:  Dilation:  1  Effacement:  50%  Station: -3  Presentation: Vertex    Membrane intact     Significant Labs:  Lab Results   Component Value Date    GROUPTRH B POS 01/10/2024    HEPBSAG Non-reactive 01/10/2024    STREPBCULT No Group B Streptococcus isolated 03/28/2024       I have personallly reviewed all pertinent lab results from the last 24 hours.  Recent Lab Results       None

## 2024-04-14 NOTE — H&P
[facility] - Labor & Delivery  Obstetrics  History & Physical    Patient Name: Ann Marie Carolina  MRN: [id number]  Admission Date: 2024  Primary Care Provider: Tasha, Primary Doctor    Subjective:     Principal Problem:Threatened labor at term    History of Present Illness:  27yo  EGA 39w3d  presents to  with c/o back pain overnight and possible leaking fluid. Denies feeling contractions in her abdomen, denies VB. Reports good fetal movement.    Obstetric HPI:  This pregnancy has been complicated by   anemia    OB History    Para Term  AB Living   1 0 0 0 0 0   SAB IAB Ectopic Multiple Live Births   0 0 0 0 0      # Outcome Date GA Lbr Jay/2nd Weight Sex Type Anes PTL Lv   1 Current              No past medical history on file.  No past surgical history on file.    Current Facility-Administered Medications   Medication Dose Route Frequency Provider Last Rate Last Admin    hydrOXYzine injection 25 mg  25 mg Intramuscular Once Deidra Rayo CNM        morphine injection 10 mg  10 mg Intramuscular Once Deidra Rayo CNM           Review of patient's allergies indicates:   Allergen Reactions    Iodinated contrast media         Family History    None       Tobacco Use    Smoking status: Never     Passive exposure: Never    Smokeless tobacco: Never   Substance and Sexual Activity    Alcohol use: Not Currently    Drug use: Never    Sexual activity: Yes     Partners: Male     Review of Systems   Gastrointestinal:  Negative for abdominal pain.   Genitourinary:  Positive for vaginal discharge (had leaking  fluid around 0530 this morning when she woke up, reports she did have intercourse last night). Negative for vaginal bleeding.   Musculoskeletal:  Positive for back pain.      Objective:     Vital Signs (Most Recent):    Vital Signs (24h Range):           There is no height or weight on file to calculate BMI.    FHT: 125 Cat 1 (reassuring)  TOCO:  Q 4-6 minutes     Physical Exam:    Constitutional: She is oriented to person, place, and time. Vital signs are normal. She appears well-developed and well-nourished. She is cooperative.    HENT:   Head: Normocephalic.      Cardiovascular:  Normal rate, regular rhythm and normal heart sounds.             Pulmonary/Chest: Effort normal.        Abdominal: Soft.   Gravid, non-tender     Genitourinary:    Vagina and uterus normal.      Genitourinary Comments: Speculum exam shows negative pooling, negative nitrazine, negative ferning, wet prep negative             Musculoskeletal: Normal range of motion and moves all extremeties.       Neurological: She is alert and oriented to person, place, and time. She has normal strength and normal reflexes.    Skin: Skin is warm and dry. Capillary refill takes less than 2 seconds.    Psychiatric: She has a normal mood and affect. Her speech is normal and behavior is normal. Judgment and thought content normal.        Cervix:  Dilation:  1  Effacement:  50%  Station: -3  Presentation: Vertex    Membrane intact     Significant Labs:  Lab Results   Component Value Date    GROUPTRH B POS 01/10/2024    HEPBSAG Non-reactive 01/10/2024    STREPBCULT No Group B Streptococcus isolated 2024       I have personallly reviewed all pertinent lab results from the last 24 hours.  Recent Lab Results       None          Assessment/Plan:     28 y.o. female  at 39w3d for:    * Threatened labor at term  24 1105  OB triage, NST reactive  Contractions q 4-6 mins  /-3,  intact  SROM ruled out  Plan to observe 2 hours and recheck cervix  Patient requesting therapeutic rest and d/c home now and not wait 2 hours for recheck  Will give morphine/vistaril now and d/c home  Labor precautions and [facility] discussed        Deidra Rayo CNM  Obstetrics  [facility] - Labor & Delivery

## 2024-04-14 NOTE — DISCHARGE INSTRUCTIONS

## 2024-04-14 NOTE — ASSESSMENT & PLAN NOTE
4/14/24 1105  OB triage, NST reactive  Contractions q 4-6 mins  VE 1/50/-3,  intact  SROM ruled out  Plan to observe 2 hours and recheck cervix  Patient requesting therapeutic rest and d/c home now and not wait 2 hours for recheck  Will give morphine/vistaril now and d/c home  Labor precautions and [facility] discussed

## 2024-04-16 ENCOUNTER — ROUTINE PRENATAL (OUTPATIENT)
Dept: OBSTETRICS AND GYNECOLOGY | Facility: CLINIC | Age: 29
End: 2024-04-16
Payer: OTHER GOVERNMENT

## 2024-04-16 VITALS
WEIGHT: 136.44 LBS | DIASTOLIC BLOOD PRESSURE: 80 MMHG | SYSTOLIC BLOOD PRESSURE: 110 MMHG | BODY MASS INDEX: 25.78 KG/M2

## 2024-04-16 DIAGNOSIS — O48.0 POST-TERM PREGNANCY, 40-42 WEEKS OF GESTATION: ICD-10-CM

## 2024-04-16 DIAGNOSIS — O99.013 ANEMIA DURING PREGNANCY IN THIRD TRIMESTER: ICD-10-CM

## 2024-04-16 DIAGNOSIS — Z34.03 ENCOUNTER FOR SUPERVISION OF NORMAL FIRST PREGNANCY, THIRD TRIMESTER: Primary | ICD-10-CM

## 2024-04-16 PROBLEM — O47.9 THREATENED LABOR AT TERM: Status: RESOLVED | Noted: 2024-04-14 | Resolved: 2024-04-16

## 2024-04-16 PROCEDURE — 99213 OFFICE O/P EST LOW 20 MIN: CPT | Mod: PBBFAC | Performed by: ADVANCED PRACTICE MIDWIFE

## 2024-04-16 PROCEDURE — 0502F SUBSEQUENT PRENATAL CARE: CPT | Mod: ,,, | Performed by: ADVANCED PRACTICE MIDWIFE

## 2024-04-16 PROCEDURE — 99999 PR PBB SHADOW E&M-EST. PATIENT-LVL III: CPT | Mod: PBBFAC,,, | Performed by: ADVANCED PRACTICE MIDWIFE

## 2024-04-16 RX ORDER — OXYTOCIN/RINGER'S LACTATE 30/500 ML
95 PLASTIC BAG, INJECTION (ML) INTRAVENOUS ONCE
Status: CANCELLED | OUTPATIENT
Start: 2024-04-16 | End: 2024-04-16

## 2024-04-16 RX ORDER — OXYTOCIN/RINGER'S LACTATE 30/500 ML
95 PLASTIC BAG, INJECTION (ML) INTRAVENOUS ONCE AS NEEDED
Status: CANCELLED | OUTPATIENT
Start: 2024-04-16 | End: 2035-09-13

## 2024-04-16 RX ORDER — SODIUM CHLORIDE, SODIUM LACTATE, POTASSIUM CHLORIDE, CALCIUM CHLORIDE 600; 310; 30; 20 MG/100ML; MG/100ML; MG/100ML; MG/100ML
INJECTION, SOLUTION INTRAVENOUS CONTINUOUS
Status: CANCELLED | OUTPATIENT
Start: 2024-04-16

## 2024-04-16 RX ORDER — OXYTOCIN/RINGER'S LACTATE 30/500 ML
334 PLASTIC BAG, INJECTION (ML) INTRAVENOUS ONCE AS NEEDED
Status: CANCELLED | OUTPATIENT
Start: 2024-04-16 | End: 2035-09-12

## 2024-04-16 RX ORDER — METHYLERGONOVINE MALEATE 0.2 MG/ML
200 INJECTION INTRAVENOUS ONCE AS NEEDED
Status: CANCELLED | OUTPATIENT
Start: 2024-04-16 | End: 2035-09-13

## 2024-04-16 RX ORDER — MISOPROSTOL 200 UG/1
800 TABLET ORAL ONCE AS NEEDED
Status: CANCELLED | OUTPATIENT
Start: 2024-04-16

## 2024-04-16 RX ORDER — ONDANSETRON 8 MG/1
8 TABLET, ORALLY DISINTEGRATING ORAL EVERY 8 HOURS PRN
Status: CANCELLED | OUTPATIENT
Start: 2024-04-16

## 2024-04-16 RX ORDER — SODIUM CHLORIDE 9 MG/ML
INJECTION, SOLUTION INTRAVENOUS
Status: CANCELLED | OUTPATIENT
Start: 2024-04-16

## 2024-04-16 RX ORDER — MISOPROSTOL 200 UG/1
800 TABLET ORAL ONCE AS NEEDED
Status: CANCELLED | OUTPATIENT
Start: 2024-04-16 | End: 2035-09-13

## 2024-04-16 RX ORDER — OXYTOCIN 10 [USP'U]/ML
10 INJECTION, SOLUTION INTRAMUSCULAR; INTRAVENOUS ONCE AS NEEDED
Status: CANCELLED | OUTPATIENT
Start: 2024-04-16 | End: 2035-09-13

## 2024-04-16 RX ORDER — OXYTOCIN/RINGER'S LACTATE 30/500 ML
334 PLASTIC BAG, INJECTION (ML) INTRAVENOUS ONCE
Status: CANCELLED | OUTPATIENT
Start: 2024-04-16 | End: 2024-04-16

## 2024-04-16 RX ORDER — CALCIUM CARBONATE 200(500)MG
500 TABLET,CHEWABLE ORAL 3 TIMES DAILY PRN
Status: CANCELLED | OUTPATIENT
Start: 2024-04-16

## 2024-04-16 RX ORDER — MUPIROCIN 20 MG/G
OINTMENT TOPICAL
Status: CANCELLED | OUTPATIENT
Start: 2024-04-16

## 2024-04-16 RX ORDER — SIMETHICONE 80 MG
1 TABLET,CHEWABLE ORAL 4 TIMES DAILY PRN
Status: CANCELLED | OUTPATIENT
Start: 2024-04-16

## 2024-04-16 RX ORDER — CARBOPROST TROMETHAMINE 250 UG/ML
250 INJECTION, SOLUTION INTRAMUSCULAR
Status: CANCELLED | OUTPATIENT
Start: 2024-04-16

## 2024-04-16 RX ORDER — DIPHENOXYLATE HYDROCHLORIDE AND ATROPINE SULFATE 2.5; .025 MG/1; MG/1
2 TABLET ORAL EVERY 6 HOURS PRN
Status: CANCELLED | OUTPATIENT
Start: 2024-04-16

## 2024-04-16 RX ORDER — TERBUTALINE SULFATE 1 MG/ML
0.25 INJECTION SUBCUTANEOUS
Status: CANCELLED | OUTPATIENT
Start: 2024-04-16

## 2024-04-16 RX ORDER — LIDOCAINE HYDROCHLORIDE 10 MG/ML
10 INJECTION INFILTRATION; PERINEURAL ONCE AS NEEDED
Status: CANCELLED | OUTPATIENT
Start: 2024-04-16 | End: 2035-09-13

## 2024-04-16 NOTE — PATIENT INSTRUCTIONS
Fetal Movement   About this topic   Feeling your baby move for the first time is a good sign that your baby is doing well. You may begin to feel these movements between the 18th and 25th weeks of your pregnancy. If this is your first time being pregnant, it may be closer to 25 weeks. Your baby has been moving around before this, but the kicks have not been strong enough for you to feel. During the first weeks of feeling movement, you may start to see a pattern during the day when your baby is most active. You can track your baby's kicks each day at home. This is also known as kick counting. It is a good way to check on your baby's movements and well being.  Most often, fetal kick counting is used in high-risk pregnancies. It may be useful for all pregnancies. Counting and writing down your baby's kicks, jabs, twists, flutters, rolls, turns, flips, and swishes may help find a problem that needs more evaluation. The American College of Obstetricians and Gynec[employer], or ACOG, suggests that you record how much time it takes you to feel 10 of these movements. Ideally, you should be able to feel 10 movements within 2 hours. Many people will track these movements in much less time.  General   How to Track Your Baby's Kick Counts   Most often your doctor will want you to wait until the 28th to 30th weeks of your pregnancy to start kick counting. Here are some tips to help you get started.  Find the time of day when your baby is most active. For some people, this is right after eating. Others find their baby moving a lot after they have been exercising or more active. Some babies are more active in the evenings when your blood sugar starts to lower.  Try to count kicks at about the same time each day.  Before you start counting, have something to eat or drink. Also take a short walk or do some light activity.  Choose a quiet place where you can focus on your baby's movements. Also get in a comfortable position. Try and lie  "on one side or the other. You may need to change positions until you find one that works best for you and your baby.  Keep a notebook to track your baby's kicks. Your doctor may give you a chart to use or you can make your own. Write down the date, time you started counting, and the time of each "kick" during a 2-hour period until you have felt 10 kicks.  Once you have recorded 10 kicks within 2 hours you can stop counting.  If you are not able to record 10 movements over 2 hours you should get up and move around or eat something and try again.  If you are not able to record 10 movements over 2 hours the second time, call your doctor. They may want you to go to the hospital to get your baby checked.  When do I need to call the doctor?   You have felt less than 10 movements over a period of 2 hours.  It takes longer each day to record 10 movements.  There is a big change in the pattern of movements you are writing down.  You feel no movement for 2 hours even after eating a snack, light activity, and position changes.  Teach Back: Helping You Understand   The Teach Back Method helps you understand the information we are giving you. After you talk with the staff, tell them in your own words what you learned. This helps to make sure the staff has described each thing clearly. It also helps to explain things that may have been confusing. Before going home, make sure you can do these:  I can tell you about feeling my baby move.  I can tell you how I will track my babys kicks.  I can tell you what I will do if I feel less than 10 movements in 2 hours, it takes longer to feel my baby move 10 times, or there is a big change in how my baby is moving.  Last Reviewed Date   2021-10-08  Consumer Information Use and Disclaimer   This information is not specific medical advice and does not replace information you receive from your health care provider. This is only a brief summary of general information. It does NOT include all " information about conditions, illnesses, injuries, tests, procedures, treatments, therapies, discharge instructions or life-style choices that may apply to you. You must talk with your health care provider for complete information about your health and treatment options. This information should not be used to decide whether or not to accept your health care providers advice, instructions or recommendations. Only your health care provider has the knowledge and training to provide advice that is right for you.  Copyright   Copyright © 2021 [employer] and its affiliates and/or licensors. All rights reserved.

## 2024-04-16 NOTE — PROGRESS NOTES
28 y.o. female  at 39w5d   Reports + FM, denies VB, LOF or regular CTX  Reports losing her mucous plug this morning, not feeling any regular contractions.    Palpated strong contraction during leopolds but patient reports not feeling    Declines VE today  GBS negative  TW lbs     Encounter for supervision of normal first pregnancy, third trimester    Anemia during pregnancy in third trimester  Iron daily    Post-term pregnancy, 40-42 weeks of gestation  Discussed post dates management, desires IOL at 41 weeks.  Scheduled @8pm  Scheduled BPP Tuesday next week  -     US OB/GYN Procedure ([facility])-Future; Future  -     IP OB Labor Induction; Future         Reviewed warning signs, normal FKCs, labor precautions and how/when to call.  RTC x 1 wk with US, call or present sooner prn.

## 2024-04-17 ENCOUNTER — TELEPHONE (OUTPATIENT)
Dept: OBSTETRICS AND GYNECOLOGY | Facility: HOSPITAL | Age: 29
End: 2024-04-17
Payer: OTHER GOVERNMENT

## 2024-04-17 PROBLEM — O47.9 THREATENED LABOR AT TERM: Status: ACTIVE | Noted: 2024-04-17

## 2024-04-17 PROBLEM — N89.8 VAGINAL DISCHARGE DURING PREGNANCY IN THIRD TRIMESTER: Status: ACTIVE | Noted: 2024-04-17

## 2024-04-17 PROBLEM — O26.893 VAGINAL DISCHARGE DURING PREGNANCY IN THIRD TRIMESTER: Status: ACTIVE | Noted: 2024-04-17

## 2024-04-17 NOTE — TELEPHONE ENCOUNTER
Pt called to L&D with c/o contractions and LOF since 0630. Pt states being 39.6, GBS negative, and planning a natural birth. Pt denies vaginal bleeding and states + fetal movement at this time. Advised pt that she is able to labor at home in early labor as advised by CNM and come in when needed for evaluation. Encouraged pt to come in for vaginal bleeding, increased pain, or decreased fetal movement. Pt verbalizes understanding.

## 2024-04-18 ENCOUNTER — HOSPITAL ENCOUNTER (INPATIENT)
Facility: HOSPITAL | Age: 29
LOS: 2 days | Discharge: HOME | End: 2024-04-20
Attending: OBSTETRICS & GYNECOLOGY | Admitting: OBSTETRICS & GYNECOLOGY
Payer: OTHER GOVERNMENT

## 2024-04-18 ENCOUNTER — ANESTHESIA (OUTPATIENT)
Dept: OBSTETRICS AND GYNECOLOGY | Facility: HOSPITAL | Age: 29
End: 2024-04-18
Payer: OTHER GOVERNMENT

## 2024-04-18 DIAGNOSIS — O42.90 AMNIOTIC FLUID LEAKING: ICD-10-CM

## 2024-04-18 DIAGNOSIS — O48.0 POST-TERM PREGNANCY, 40-42 WEEKS OF GESTATION: ICD-10-CM

## 2024-04-18 LAB
ABO + RH BLD: NORMAL
ALBUMIN SERPL BCP-MCNC: 3.1 G/DL (ref 3.5–5.2)
ALP SERPL-CCNC: 222 U/L (ref 55–135)
ALT SERPL W/O P-5'-P-CCNC: 49 U/L (ref 10–44)
ANION GAP SERPL CALC-SCNC: 15 MMOL/L (ref 8–16)
AST SERPL-CCNC: 41 U/L (ref 10–40)
BASOPHILS # BLD AUTO: 0.03 K/UL (ref 0–0.2)
BASOPHILS NFR BLD: 0.4 % (ref 0–1.9)
BILIRUB SERPL-MCNC: 0.5 MG/DL (ref 0.1–1)
BLD GP AB SCN CELLS X3 SERPL QL: NORMAL
BUN SERPL-MCNC: 11 MG/DL (ref 6–20)
CALCIUM SERPL-MCNC: 9.5 MG/DL (ref 8.7–10.5)
CHLORIDE SERPL-SCNC: 109 MMOL/L (ref 95–110)
CO2 SERPL-SCNC: 15 MMOL/L (ref 23–29)
CREAT SERPL-MCNC: 0.8 MG/DL (ref 0.5–1.4)
DIFFERENTIAL METHOD BLD: ABNORMAL
EOSINOPHIL # BLD AUTO: 0 K/UL (ref 0–0.5)
EOSINOPHIL NFR BLD: 0.5 % (ref 0–8)
ERYTHROCYTE [DISTWIDTH] IN BLOOD BY AUTOMATED COUNT: 11.8 % (ref 11.5–14.5)
EST. GFR  (NO RACE VARIABLE): >60 ML/MIN/1.73 M^2
GLUCOSE SERPL-MCNC: 66 MG/DL (ref 70–110)
HCT VFR BLD AUTO: 37 % (ref 37–48.5)
HGB BLD-MCNC: 13.3 G/DL (ref 12–16)
IMM GRANULOCYTES # BLD AUTO: 0.06 K/UL (ref 0–0.04)
IMM GRANULOCYTES NFR BLD AUTO: 0.8 % (ref 0–0.5)
LYMPHOCYTES # BLD AUTO: 2.1 K/UL (ref 1–4.8)
LYMPHOCYTES NFR BLD: 26.9 % (ref 18–48)
MCH RBC QN AUTO: 32 PG (ref 27–31)
MCHC RBC AUTO-ENTMCNC: 35.9 G/DL (ref 32–36)
MCV RBC AUTO: 89 FL (ref 82–98)
MONOCYTES # BLD AUTO: 0.5 K/UL (ref 0.3–1)
MONOCYTES NFR BLD: 6.3 % (ref 4–15)
NEUTROPHILS # BLD AUTO: 5 K/UL (ref 1.8–7.7)
NEUTROPHILS NFR BLD: 65.1 % (ref 38–73)
NRBC BLD-RTO: 0 /100 WBC
PLATELET # BLD AUTO: 191 K/UL (ref 150–450)
PMV BLD AUTO: 10.3 FL (ref 9.2–12.9)
POTASSIUM SERPL-SCNC: 3.8 MMOL/L (ref 3.5–5.1)
PROT SERPL-MCNC: 7 G/DL (ref 6–8.4)
RBC # BLD AUTO: 4.15 M/UL (ref 4–5.4)
SODIUM SERPL-SCNC: 139 MMOL/L (ref 136–145)
WBC # BLD AUTO: 7.73 K/UL (ref 3.9–12.7)

## 2024-04-18 PROCEDURE — 59400 OBSTETRICAL CARE: CPT | Mod: ,,, | Performed by: OBSTETRICS & GYNECOLOGY

## 2024-04-18 PROCEDURE — 10907ZC DRAINAGE OF AMNIOTIC FLUID, THERAPEUTIC FROM PRODUCTS OF CONCEPTION, VIA NATURAL OR ARTIFICIAL OPENING: ICD-10-PCS | Performed by: OBSTETRICS & GYNECOLOGY

## 2024-04-18 PROCEDURE — 86850 RBC ANTIBODY SCREEN: CPT

## 2024-04-18 PROCEDURE — 85025 COMPLETE CBC W/AUTO DIFF WBC: CPT

## 2024-04-18 PROCEDURE — 72200004 HC VAGINAL DELIVERY LEVEL I

## 2024-04-18 PROCEDURE — 63600175 PHARM REV CODE 636 W HCPCS: Performed by: ADVANCED PRACTICE MIDWIFE

## 2024-04-18 PROCEDURE — 51701 INSERT BLADDER CATHETER: CPT

## 2024-04-18 PROCEDURE — 72100003 HC LABOR CARE, EA. ADDL. 8 HRS

## 2024-04-18 PROCEDURE — 0KQM0ZZ REPAIR PERINEUM MUSCLE, OPEN APPROACH: ICD-10-PCS | Performed by: OBSTETRICS & GYNECOLOGY

## 2024-04-18 PROCEDURE — 72100002 HC LABOR CARE, 1ST 8 HOURS

## 2024-04-18 PROCEDURE — 25000003 PHARM REV CODE 250: Performed by: NURSE ANESTHETIST, CERTIFIED REGISTERED

## 2024-04-18 PROCEDURE — 27200710 HC EPIDURAL INFUSION PUMP SET: Performed by: STUDENT IN AN ORGANIZED HEALTH CARE EDUCATION/TRAINING PROGRAM

## 2024-04-18 PROCEDURE — 62326 NJX INTERLAMINAR LMBR/SAC: CPT | Performed by: NURSE ANESTHETIST, CERTIFIED REGISTERED

## 2024-04-18 PROCEDURE — 11000001 HC ACUTE MED/SURG PRIVATE ROOM

## 2024-04-18 PROCEDURE — 63600175 PHARM REV CODE 636 W HCPCS

## 2024-04-18 PROCEDURE — 80053 COMPREHEN METABOLIC PANEL: CPT

## 2024-04-18 PROCEDURE — 51702 INSERT TEMP BLADDER CATH: CPT

## 2024-04-18 PROCEDURE — 25000003 PHARM REV CODE 250: Performed by: OBSTETRICS & GYNECOLOGY

## 2024-04-18 PROCEDURE — 27800516 HC TRAY, EPIDURAL COMBO: Performed by: STUDENT IN AN ORGANIZED HEALTH CARE EDUCATION/TRAINING PROGRAM

## 2024-04-18 PROCEDURE — 63600175 PHARM REV CODE 636 W HCPCS: Performed by: STUDENT IN AN ORGANIZED HEALTH CARE EDUCATION/TRAINING PROGRAM

## 2024-04-18 RX ORDER — OXYTOCIN/RINGER'S LACTATE 30/500 ML
334 PLASTIC BAG, INJECTION (ML) INTRAVENOUS ONCE
Status: DISCONTINUED | OUTPATIENT
Start: 2024-04-18 | End: 2024-04-18

## 2024-04-18 RX ORDER — HYDROCODONE BITARTRATE AND ACETAMINOPHEN 5; 325 MG/1; MG/1
1 TABLET ORAL EVERY 4 HOURS PRN
Status: DISCONTINUED | OUTPATIENT
Start: 2024-04-18 | End: 2024-04-20 | Stop reason: HOSPADM

## 2024-04-18 RX ORDER — MISOPROSTOL 200 UG/1
800 TABLET ORAL ONCE AS NEEDED
Status: DISCONTINUED | OUTPATIENT
Start: 2024-04-18 | End: 2024-04-20 | Stop reason: HOSPADM

## 2024-04-18 RX ORDER — DOCUSATE SODIUM 100 MG/1
100 CAPSULE, LIQUID FILLED ORAL DAILY
Status: DISCONTINUED | OUTPATIENT
Start: 2024-04-19 | End: 2024-04-20 | Stop reason: HOSPADM

## 2024-04-18 RX ORDER — HYDROCODONE BITARTRATE AND ACETAMINOPHEN 10; 325 MG/1; MG/1
1 TABLET ORAL EVERY 4 HOURS PRN
Status: DISCONTINUED | OUTPATIENT
Start: 2024-04-18 | End: 2024-04-20 | Stop reason: HOSPADM

## 2024-04-18 RX ORDER — OXYTOCIN/RINGER'S LACTATE 30/500 ML
0-32 PLASTIC BAG, INJECTION (ML) INTRAVENOUS CONTINUOUS
Status: DISCONTINUED | OUTPATIENT
Start: 2024-04-18 | End: 2024-04-20 | Stop reason: HOSPADM

## 2024-04-18 RX ORDER — OXYTOCIN 10 [USP'U]/ML
10 INJECTION, SOLUTION INTRAMUSCULAR; INTRAVENOUS ONCE AS NEEDED
Status: DISCONTINUED | OUTPATIENT
Start: 2024-04-18 | End: 2024-04-20 | Stop reason: HOSPADM

## 2024-04-18 RX ORDER — CARBOPROST TROMETHAMINE 250 UG/ML
250 INJECTION, SOLUTION INTRAMUSCULAR
Status: DISCONTINUED | OUTPATIENT
Start: 2024-04-18 | End: 2024-04-20 | Stop reason: HOSPADM

## 2024-04-18 RX ORDER — OXYTOCIN 10 [USP'U]/ML
10 INJECTION, SOLUTION INTRAMUSCULAR; INTRAVENOUS ONCE AS NEEDED
Status: DISCONTINUED | OUTPATIENT
Start: 2024-04-18 | End: 2024-04-18

## 2024-04-18 RX ORDER — OXYTOCIN/RINGER'S LACTATE 30/500 ML
95 PLASTIC BAG, INJECTION (ML) INTRAVENOUS ONCE AS NEEDED
Status: DISCONTINUED | OUTPATIENT
Start: 2024-04-18 | End: 2024-04-20 | Stop reason: HOSPADM

## 2024-04-18 RX ORDER — DIPHENOXYLATE HYDROCHLORIDE AND ATROPINE SULFATE 2.5; .025 MG/1; MG/1
2 TABLET ORAL EVERY 6 HOURS PRN
Status: DISCONTINUED | OUTPATIENT
Start: 2024-04-18 | End: 2024-04-18

## 2024-04-18 RX ORDER — LIDOCAINE HYDROCHLORIDE AND EPINEPHRINE 15; 5 MG/ML; UG/ML
INJECTION, SOLUTION EPIDURAL
Status: DISCONTINUED | OUTPATIENT
Start: 2024-04-18 | End: 2024-04-18

## 2024-04-18 RX ORDER — CARBOPROST TROMETHAMINE 250 UG/ML
250 INJECTION, SOLUTION INTRAMUSCULAR
Status: DISCONTINUED | OUTPATIENT
Start: 2024-04-18 | End: 2024-04-18

## 2024-04-18 RX ORDER — SODIUM CHLORIDE 0.9 % (FLUSH) 0.9 %
10 SYRINGE (ML) INJECTION
Status: DISCONTINUED | OUTPATIENT
Start: 2024-04-18 | End: 2024-04-20 | Stop reason: HOSPADM

## 2024-04-18 RX ORDER — HYDROCORTISONE 25 MG/G
CREAM TOPICAL 3 TIMES DAILY PRN
Status: DISCONTINUED | OUTPATIENT
Start: 2024-04-18 | End: 2024-04-20 | Stop reason: HOSPADM

## 2024-04-18 RX ORDER — SODIUM CHLORIDE, SODIUM LACTATE, POTASSIUM CHLORIDE, CALCIUM CHLORIDE 600; 310; 30; 20 MG/100ML; MG/100ML; MG/100ML; MG/100ML
INJECTION, SOLUTION INTRAVENOUS CONTINUOUS
Status: DISCONTINUED | OUTPATIENT
Start: 2024-04-18 | End: 2024-04-18

## 2024-04-18 RX ORDER — METHYLERGONOVINE MALEATE 0.2 MG/ML
200 INJECTION INTRAVENOUS ONCE AS NEEDED
Status: DISCONTINUED | OUTPATIENT
Start: 2024-04-18 | End: 2024-04-18

## 2024-04-18 RX ORDER — METHYLERGONOVINE MALEATE 0.2 MG/ML
200 INJECTION INTRAVENOUS ONCE AS NEEDED
Status: DISCONTINUED | OUTPATIENT
Start: 2024-04-18 | End: 2024-04-20 | Stop reason: HOSPADM

## 2024-04-18 RX ORDER — OXYTOCIN/RINGER'S LACTATE 30/500 ML
334 PLASTIC BAG, INJECTION (ML) INTRAVENOUS ONCE AS NEEDED
Status: DISCONTINUED | OUTPATIENT
Start: 2024-04-18 | End: 2024-04-20 | Stop reason: HOSPADM

## 2024-04-18 RX ORDER — ONDANSETRON 8 MG/1
8 TABLET, ORALLY DISINTEGRATING ORAL EVERY 8 HOURS PRN
Status: DISCONTINUED | OUTPATIENT
Start: 2024-04-18 | End: 2024-04-20 | Stop reason: HOSPADM

## 2024-04-18 RX ORDER — SIMETHICONE 80 MG
1 TABLET,CHEWABLE ORAL 4 TIMES DAILY PRN
Status: DISCONTINUED | OUTPATIENT
Start: 2024-04-18 | End: 2024-04-18

## 2024-04-18 RX ORDER — LIDOCAINE HYDROCHLORIDE 10 MG/ML
10 INJECTION, SOLUTION EPIDURAL; INFILTRATION; INTRACAUDAL; PERINEURAL ONCE AS NEEDED
Status: DISCONTINUED | OUTPATIENT
Start: 2024-04-18 | End: 2024-04-20 | Stop reason: HOSPADM

## 2024-04-18 RX ORDER — TRANEXAMIC ACID 10 MG/ML
1000 INJECTION, SOLUTION INTRAVENOUS EVERY 30 MIN PRN
Status: DISCONTINUED | OUTPATIENT
Start: 2024-04-18 | End: 2024-04-20 | Stop reason: HOSPADM

## 2024-04-18 RX ORDER — DIPHENOXYLATE HYDROCHLORIDE AND ATROPINE SULFATE 2.5; .025 MG/1; MG/1
2 TABLET ORAL EVERY 6 HOURS PRN
Status: DISCONTINUED | OUTPATIENT
Start: 2024-04-18 | End: 2024-04-20 | Stop reason: HOSPADM

## 2024-04-18 RX ORDER — AMMONIA 15 % (W/V)
0.3 AMPUL (EA) INHALATION CONTINUOUS PRN
Status: DISCONTINUED | OUTPATIENT
Start: 2024-04-18 | End: 2024-04-20 | Stop reason: HOSPADM

## 2024-04-18 RX ORDER — CALCIUM CARBONATE 200(500)MG
500 TABLET,CHEWABLE ORAL 3 TIMES DAILY PRN
Status: DISCONTINUED | OUTPATIENT
Start: 2024-04-18 | End: 2024-04-20 | Stop reason: HOSPADM

## 2024-04-18 RX ORDER — OXYTOCIN/RINGER'S LACTATE 30/500 ML
95 PLASTIC BAG, INJECTION (ML) INTRAVENOUS ONCE
Status: DISCONTINUED | OUTPATIENT
Start: 2024-04-18 | End: 2024-04-20 | Stop reason: HOSPADM

## 2024-04-18 RX ORDER — SODIUM CITRATE AND CITRIC ACID MONOHYDRATE 334; 500 MG/5ML; MG/5ML
30 SOLUTION ORAL ONCE
Status: DISCONTINUED | OUTPATIENT
Start: 2024-04-18 | End: 2024-04-18

## 2024-04-18 RX ORDER — IBUPROFEN 600 MG/1
600 TABLET ORAL EVERY 6 HOURS
Status: DISCONTINUED | OUTPATIENT
Start: 2024-04-18 | End: 2024-04-20 | Stop reason: HOSPADM

## 2024-04-18 RX ORDER — SODIUM CHLORIDE, SODIUM LACTATE, POTASSIUM CHLORIDE, CALCIUM CHLORIDE 600; 310; 30; 20 MG/100ML; MG/100ML; MG/100ML; MG/100ML
INJECTION, SOLUTION INTRAVENOUS CONTINUOUS
Status: DISCONTINUED | OUTPATIENT
Start: 2024-04-18 | End: 2024-04-20 | Stop reason: HOSPADM

## 2024-04-18 RX ORDER — OXYTOCIN/RINGER'S LACTATE 30/500 ML
334 PLASTIC BAG, INJECTION (ML) INTRAVENOUS ONCE AS NEEDED
Status: DISCONTINUED | OUTPATIENT
Start: 2024-04-18 | End: 2024-04-18

## 2024-04-18 RX ORDER — LIDOCAINE HYDROCHLORIDE 10 MG/ML
10 INJECTION, SOLUTION EPIDURAL; INFILTRATION; INTRACAUDAL; PERINEURAL ONCE AS NEEDED
Status: DISCONTINUED | OUTPATIENT
Start: 2024-04-18 | End: 2024-04-18

## 2024-04-18 RX ORDER — OXYTOCIN/RINGER'S LACTATE 30/500 ML
334 PLASTIC BAG, INJECTION (ML) INTRAVENOUS ONCE
Status: DISCONTINUED | OUTPATIENT
Start: 2024-04-18 | End: 2024-04-20 | Stop reason: HOSPADM

## 2024-04-18 RX ORDER — ONDANSETRON 8 MG/1
8 TABLET, ORALLY DISINTEGRATING ORAL EVERY 8 HOURS PRN
Status: DISCONTINUED | OUTPATIENT
Start: 2024-04-18 | End: 2024-04-18

## 2024-04-18 RX ORDER — CALCIUM CARBONATE 200(500)MG
500 TABLET,CHEWABLE ORAL 3 TIMES DAILY PRN
Status: DISCONTINUED | OUTPATIENT
Start: 2024-04-18 | End: 2024-04-18

## 2024-04-18 RX ORDER — OXYTOCIN/RINGER'S LACTATE 30/500 ML
95 PLASTIC BAG, INJECTION (ML) INTRAVENOUS ONCE AS NEEDED
Status: DISCONTINUED | OUTPATIENT
Start: 2024-04-18 | End: 2024-04-18

## 2024-04-18 RX ORDER — OXYTOCIN/RINGER'S LACTATE 30/500 ML
95 PLASTIC BAG, INJECTION (ML) INTRAVENOUS ONCE
Status: DISCONTINUED | OUTPATIENT
Start: 2024-04-18 | End: 2024-04-18

## 2024-04-18 RX ORDER — OXYTOCIN/RINGER'S LACTATE 30/500 ML
41.65 PLASTIC BAG, INJECTION (ML) INTRAVENOUS CONTINUOUS
Status: ACTIVE | OUTPATIENT
Start: 2024-04-18 | End: 2024-04-19

## 2024-04-18 RX ORDER — MISOPROSTOL 200 UG/1
800 TABLET ORAL ONCE AS NEEDED
Status: DISCONTINUED | OUTPATIENT
Start: 2024-04-18 | End: 2024-04-18

## 2024-04-18 RX ORDER — FAMOTIDINE 10 MG/ML
20 INJECTION INTRAVENOUS ONCE
Status: DISCONTINUED | OUTPATIENT
Start: 2024-04-18 | End: 2024-04-18

## 2024-04-18 RX ORDER — TRANEXAMIC ACID 10 MG/ML
1000 INJECTION, SOLUTION INTRAVENOUS EVERY 30 MIN PRN
Status: DISCONTINUED | OUTPATIENT
Start: 2024-04-18 | End: 2024-04-18

## 2024-04-18 RX ORDER — DIPHENHYDRAMINE HCL 25 MG
25 CAPSULE ORAL EVERY 4 HOURS PRN
Status: DISCONTINUED | OUTPATIENT
Start: 2024-04-18 | End: 2024-04-20 | Stop reason: HOSPADM

## 2024-04-18 RX ORDER — ACETAMINOPHEN 325 MG/1
650 TABLET ORAL EVERY 6 HOURS SCHEDULED
Status: DISCONTINUED | OUTPATIENT
Start: 2024-04-18 | End: 2024-04-20 | Stop reason: HOSPADM

## 2024-04-18 RX ORDER — ROPIVACAINE HYDROCHLORIDE 2 MG/ML
12 INJECTION, SOLUTION EPIDURAL; INFILTRATION CONTINUOUS
Status: DISCONTINUED | OUTPATIENT
Start: 2024-04-18 | End: 2024-04-18

## 2024-04-18 RX ORDER — TERBUTALINE SULFATE 1 MG/ML
0.25 INJECTION SUBCUTANEOUS
Status: DISCONTINUED | OUTPATIENT
Start: 2024-04-18 | End: 2024-04-18

## 2024-04-18 RX ORDER — PRENATAL WITH FERROUS FUM AND FOLIC ACID 3080; 920; 120; 400; 22; 1.84; 3; 20; 10; 1; 12; 200; 27; 25; 2 [IU]/1; [IU]/1; MG/1; [IU]/1; MG/1; MG/1; MG/1; MG/1; MG/1; MG/1; UG/1; MG/1; MG/1; MG/1; MG/1
1 TABLET ORAL DAILY
Status: DISCONTINUED | OUTPATIENT
Start: 2024-04-19 | End: 2024-04-20 | Stop reason: HOSPADM

## 2024-04-18 RX ORDER — MUPIROCIN 20 MG/G
OINTMENT TOPICAL
Status: DISCONTINUED | OUTPATIENT
Start: 2024-04-18 | End: 2024-04-18

## 2024-04-18 RX ORDER — SIMETHICONE 80 MG
1 TABLET,CHEWABLE ORAL 4 TIMES DAILY PRN
Status: DISCONTINUED | OUTPATIENT
Start: 2024-04-18 | End: 2024-04-20 | Stop reason: HOSPADM

## 2024-04-18 RX ORDER — SODIUM CHLORIDE 9 MG/ML
INJECTION, SOLUTION INTRAVENOUS
Status: DISCONTINUED | OUTPATIENT
Start: 2024-04-18 | End: 2024-04-18

## 2024-04-18 RX ADMIN — LIDOCAINE HYDROCHLORIDE,EPINEPHRINE BITARTRATE 3 ML: 15; .005 INJECTION, SOLUTION EPIDURAL; INFILTRATION; INTRACAUDAL; PERINEURAL at 12:04

## 2024-04-18 RX ADMIN — IBUPROFEN 600 MG: 600 TABLET, FILM COATED ORAL at 08:04

## 2024-04-18 RX ADMIN — SODIUM CHLORIDE, POTASSIUM CHLORIDE, SODIUM LACTATE AND CALCIUM CHLORIDE: 600; 310; 30; 20 INJECTION, SOLUTION INTRAVENOUS at 01:04

## 2024-04-18 RX ADMIN — SODIUM CHLORIDE, POTASSIUM CHLORIDE, SODIUM LACTATE AND CALCIUM CHLORIDE: 600; 310; 30; 20 INJECTION, SOLUTION INTRAVENOUS at 04:04

## 2024-04-18 RX ADMIN — SODIUM CHLORIDE, POTASSIUM CHLORIDE, SODIUM LACTATE AND CALCIUM CHLORIDE 1000 ML: 600; 310; 30; 20 INJECTION, SOLUTION INTRAVENOUS at 12:04

## 2024-04-18 RX ADMIN — ACETAMINOPHEN 650 MG: 325 TABLET ORAL at 08:04

## 2024-04-18 RX ADMIN — Medication 2 MILLI-UNITS/MIN: at 04:04

## 2024-04-18 RX ADMIN — ROPIVACAINE HYDROCHLORIDE 12 ML/HR: 2 INJECTION, SOLUTION EPIDURAL; INFILTRATION at 12:04

## 2024-04-18 RX ADMIN — ROPIVACAINE HYDROCHLORIDE 8 ML: 2 INJECTION, SOLUTION EPIDURAL; INFILTRATION at 12:04

## 2024-04-18 NOTE — PROGRESS NOTES
[facility]  Obstetrics  Labor Progress Note    Patient Name: Ann Marie Torres-on  MRN: [id number]  Admission Date: 2024  Hospital Length of Stay: 0 days  Attending Physician: Magan Godwin MD  Primary Care Provider: Tasha, Primary Doctor    Subjective:     Principal Problem:Amniotic fluid leaking    Hospital Course:  Grossly ruptured clear fluid  Admit to LD for SROM  Speculum exam performed. Positive pooling, positive fern, positive Nitrazine, cervix appears to be dilated the same as previous visit on 24  Declined VE for now.  Agreeable for VE at 0345 (4 hours after rupture). If no change is made from previous visit check then will start pitocin 2x2  Desires NCB   Anticipate      Interval History:  Ann Marie is a 28 y.o.  at 40w0d. She is doing well. Comfortable with epidural.    Objective:     Vital Signs (Most Recent):  Temp: 98.2 °F (36.8 °C) (24 1400)  Pulse: 83 (24 1415)  Resp: 16 (24 1400)  BP: 124/78 (24 1415)  SpO2: 99 % (24 1415) Vital Signs (24h Range):  Temp:  [97.6 °F (36.4 °C)-98.7 °F (37.1 °C)] 98.2 °F (36.8 °C)  Pulse:  [65-92] 83  Resp:  [16] 16  SpO2:  [97 %-100 %] 99 %  BP: (112-139)/(66-92) 124/78     Weight: 61.2 kg (134 lb 14.7 oz)  Body mass index is 25.49 kg/m².    FHT: Cat 1 (reassuring)  TOCO:  Q 2 minutes    Cervical Exam by RN:  Dilation:  7  Effacement:  100%  Station: 0  Presentation: Vertex     Significant Labs:  Lab Results   Component Value Date    GROUPTRH B POS 2024    HEPBSAG Non-reactive 01/10/2024    STREPBCULT No Group B Streptococcus isolated 2024       Recent Lab Results         24  0216        Albumin 3.1              ALT 49       Anion Gap 15       AST 41       Baso # 0.03       Basophil % 0.4       BILIRUBIN TOTAL 0.5  Comment: For infants and newborns, interpretation of results should be based  on gestational age, weight and in agreement with clinical  observations.    Premature Infant  recommended reference ranges:  Up to 24 hours.............<8.0 mg/dL  Up to 48 hours............<12.0 mg/dL  3-5 days..................<15.0 mg/dL  6-29 days.................<15.0 mg/dL         BUN 11       Calcium 9.5       Chloride 109       CO2 15       Creatinine 0.8       Differential Method Automated       eGFR >60       Eos # 0.0       Eos % 0.5       Glucose 66       Gran # (ANC) 5.0       Gran % 65.1       Group & Rh B POS       Hematocrit 37.0       Hemoglobin 13.3       Immature Grans (Abs) 0.06  Comment: Mild elevation in immature granulocytes is non specific and   can be seen in a variety of conditions including stress response,   acute inflammation, trauma and pregnancy. Correlation with other   laboratory and clinical findings is essential.         Immature Granulocytes 0.8       INDIRECT GAVI NEG       Lymph # 2.1       Lymph % 26.9       MCH 32.0       MCHC 35.9       MCV 89       Mono # 0.5       Mono % 6.3       MPV 10.3       nRBC 0       Platelet Count 191       Potassium 3.8       PROTEIN TOTAL 7.0       RBC 4.15       RDW 11.8       Sodium 139       WBC 7.73               Physical Exam:   Constitutional: She is oriented to person, place, and time. She appears well-developed and well-nourished. No distress.                           Neurological: She is alert and oriented to person, place, and time.     Psychiatric: She has a normal mood and affect. Her behavior is normal. Judgment and thought content normal.       Review of Systems  Assessment/Plan:     28 y.o. female  at 40w0d for:    * Amniotic fluid leaking  Grossly ruptured clear fluid  Admit to LD for SROM  Speculum exam performed. Positive pooling, positive fern, positive Nitrazine, cervix appears to be dilated the same as previous visit on 24  Declined VE for now.  Agreeable for VE at 0345 (4 hours after rupture). If no change is made from previous visit check then will start pitocin 2x2  Desires NCB   Anticipate    13:00:  epidural in place; AROM of forebag, clear fluid; continue pitocin at 8  15:00: 7cm; labor progressing well; continue pit    Anemia during pregnancy in third trimester  CBC on admit    Hypokalemia  Potasium on admit 3.8          Terra Bonilla MD  Obstetrics  [facility]

## 2024-04-18 NOTE — H&P
"  [facility] - [facility] Delivery  Obstetrics  History & Physical    Patient Name: Ann Marie Carolina  MRN: [id number]  Admission Date: 2024  Primary Care Provider: Tasha, Primary Doctor    Subjective:     Principal Problem:Amniotic fluid leaking    History of Present Illness:  28 y.o.  40w0d with c/o water breaking "for real this time" at 2345 clear       Obstetric HPI:  Patient reports Frequency: Every 10 minutes contractions, active fetal movement, No vaginal bleeding , Yes loss of fluid     This pregnancy has been complicated by hypokalemia, anemia    OB History    Para Term  AB Living   1 0 0 0 0 0   SAB IAB Ectopic Multiple Live Births   0 0 0 0 0      # Outcome Date GA Lbr Jay/2nd Weight Sex Type Anes PTL Lv   1 Current              No past medical history on file.  No past surgical history on file.    Current Facility-Administered Medications   Medication Dose Route Frequency Provider Last Rate Last Admin    calcium carbonate 200 mg calcium (500 mg) chewable tablet 500 mg  500 mg Oral TID PRN Blank, Sol SADLER CNM        carboprost injection 250 mcg  250 mcg Intramuscular Q15 Min PRN Blank, Sol SADLER CNM        diphenoxylate-atropine 2.5-0.025 mg per tablet 2 tablet  2 tablet Oral Q6H PRN Blank, Sol SADLER CNM        lactated ringers bolus 1,000 mL  1,000 mL Intravenous PRN Blank, Sol SADLER CNM        lactated ringers bolus 500 mL  500 mL Intravenous PRN Blank, Sol SADLER CNM        lactated ringers bolus 500 mL  500 mL Intravenous Once PRN Blank, Sol SADLER CNM        lactated ringers infusion   Intravenous Continuous Blank, Sol SADLER CNM        LIDOcaine (PF) 10 mg/ml (1%) injection 100 mg  10 mL Intradermal Once PRN Blank, Sol SADLER CNM        methylergonovine injection 200 mcg  200 mcg Intramuscular Once PRN Blank, Sol SADLER CNM        miSOPROStoL tablet 800 mcg  800 mcg Rectal Once PRN Blank, Sol SADLER CNM        miSOPROStoL tablet 800 mcg  800 mcg Oral Once PRN Blank, Sol SADLER CNM        ondansetron disintegrating " tablet 8 mg  8 mg Oral Q8H PRN Blank, Sol SADLER CNM        oxytocin 30 units in 500 mL lactated ringers infusion (non-titrating)  334 brad-units/min Intravenous Once Blank, Sol SADLER CNM        oxytocin 30 units in 500 mL lactated ringers infusion (non-titrating)  95 brad-units/min Intravenous Once Blank, Sol SADLER CNM        oxytocin 30 units in 500 mL lactated ringers infusion (non-titrating)  334 brad-units/min Intravenous Once PRN Blank, Sol SADLER CNM        oxytocin 30 units in 500 mL lactated ringers infusion (non-titrating)  95 brad-units/min Intravenous Once PRN Blank, Sol SADLER CNM        oxytocin 30 units in 500 mL lactated ringers infusion (titrating)  0-32 brad-units/min Intravenous Continuous Sol Bowen CNM        oxytocin injection 10 Units  10 Units Intramuscular Once PRN Blank, Sol SADLER CNM        simethicone chewable tablet 80 mg  1 tablet Oral QID PRN Sol Bowen CNM        tranexamic acid in NaCl,iso-os IVPB 1,000 mg  1,000 mg Intravenous Q30 Min PRN Sol Bowen CNM           Review of patient's allergies indicates:   Allergen Reactions    Iodinated contrast media         Family History    None       Tobacco Use    Smoking status: Never     Passive exposure: Never    Smokeless tobacco: Never   Substance and Sexual Activity    Alcohol use: Not Currently    Drug use: Never    Sexual activity: Yes     Partners: Male     Review of Systems   Gastrointestinal:  Positive for abdominal pain.   Genitourinary:  Positive for vaginal discharge.   All other systems reviewed and are negative.     Objective:     Vital Signs (Most Recent):    Vital Signs (24h Range):  Temp:  [98.6 °F (37 °C)] 98.6 °F (37 °C)  Pulse:  [] 73  Resp:  [16] 16  SpO2:  [97 %-100 %] 97 %  BP: (106-136)/(67-84) 121/77        There is no height or weight on file to calculate BMI.    FHT: 135Cat 1 (reassuring)  TOCO:  irregular     Physical Exam:   Constitutional: She is oriented to person, place, and time. She appears  well-developed and well-nourished.       Cardiovascular:  Normal rate.             Pulmonary/Chest: Effort normal. No respiratory distress.        Abdominal: Soft.     Genitourinary:    Uterus normal.   There is vaginal discharge in the vagina.    Genitourinary Comments: Grossly rupture clear fluid             Musculoskeletal: Moves all extremeties.       Neurological: She is alert and oriented to person, place, and time.    Skin: Skin is warm and dry.    Psychiatric: She has a normal mood and affect.        Cervix: declined       Significant Labs:  Lab Results   Component Value Date    GROUPTRH B POS 01/10/2024    HEPBSAG Non-reactive 01/10/2024    STREPBCULT No Group B Streptococcus isolated 2024       I have personallly reviewed all pertinent lab results from the last 24 hours.  Assessment/Plan:     28 y.o. female  at 40w0d for:    * Amniotic fluid leaking  Grossly ruptured clear fluid  Admit to LD for SROM  Speculum exam performed. Positive pooling, positive fern, positive Nitrazine, cervix appears to be dilated the same as previous visit on 24  Declined VE for now.  Agreeable for VE at 0345 (4 hours after rupture). If no change is made from previous visit check then will start pitocin 2x2  Desires NCB   Anticipate          Sol Bowen CNM  Obstetrics  [facility]

## 2024-04-18 NOTE — L&D DELIVERY NOTE
[facility] - [facility]  Vaginal Delivery   Operative Note    SUMMARY     Normal spontaneous vaginal delivery of live male infant, was placed on mothers abdomen for skin to skin and bulb suctioning performed.  Infant delivered position OA over intact perineum.  Nuchal cord: Yes, cord reduced at perineum.    Spontaneous delivery of placenta and IV pitocin given noting good uterine tone.  2nd degree laceration noted and repaired in normal fashion with 2-0 and 3-0 chromic.  Bilateral hymenal ring tears at 10 and 2 o'clock repaired with 3-0 figure of 8 chromic for hemostasis .  Patient tolerated delivery well. Sponge needle and lap counted correctly x2.    Indications: Amniotic fluid leaking  Pregnancy complicated by:   Patient Active Problem List   Diagnosis    Hematuria    Hypokalemia    Anemia during pregnancy in third trimester    Vaginal discharge during pregnancy in third trimester    Threatened labor at term    Amniotic fluid leaking     Admitting GA: 40w0d    Delivery Information for Federico Torres-on    Birth information:  YOB: 2022   Time of birth: 5:16 PM   Sex: male   Head Delivery Date/Time:     Delivery type:    Gestational Age: 40w0d        Delivery Providers    Delivering clinician:            Measurements    Weight:   Length:          Apgars    Living status:   Apgar Component Scores:  1 min.:  5 min.:  10 min.:  15 min.:  20 min.:    Skin color:         Heart rate:         Reflex irritability:         Muscle tone:         Respiratory effort:         Total:                                  Interventions/Resuscitation           Cord    No data filed       Placenta    Placenta delivery date/time:   Placenta removal:            Labor Events:       labor:       Labor Onset Date/Time:         Dilation Complete Date/Time:         Start Pushing Date/Time:         Start Pushing Date/Time:       Rupture Date/Time: 24  1304         Rupture type: Forebag (ruptured per MD)          Fluid Amount:       Fluid Color: Bloody, Clear               steroids:       Antibiotics given for GBS:       Induction:       Indications for induction:        Augmentation:       Indications for augmentation:       Labor complications:       Additional complications:          Cervical ripening:                     Delivery:      Episiotomy:       Indication for Episiotomy:       Perineal Lacerations:   Repaired:      Periurethral Laceration:   Repaired:     Labial Laceration:   Repaired:     Sulcus Laceration:   Repaired:     Vaginal Laceration:   Repaired:     Cervical Laceration:   Repaired:     Repair suture:       Repair # of packets:       Last Value - EBL - Nursing (mL):       Sum - EBL - Nursing (mL): 0     Last Value - EBL - Anesthesia (mL):      Calculated QBL (mL):       Running total QBL (mL):       Vaginal Sweep Performed:       Surgicount Correct:       Vaginal Packing:   Quantity:       Other providers:            Details (if applicable):  Trial of Labor      Categorization:      Priority:     Indications for :     Incision Type:       Additional  information:  Forceps:    Vacuum:    Breech:    Observed anomalies    Other (Comments):

## 2024-04-18 NOTE — PLAN OF CARE
Pt is progressing in labor. Pt now has an epidural. Pt reports that she is now comfortable. Dr. Bonilla AROM forebag at 1304. Fluid was a bloody/clear. SROM at 2345 yesterday. Thao catheter in place with adequate output. Vitals are stable. Pt is afebrile. All questions and concerns expressed and answered.

## 2024-04-18 NOTE — ANESTHESIA POSTPROCEDURE EVALUATION
Anesthesia Post Evaluation    Patient: Ann Marie Saeng-on    Procedure(s) Performed: * No procedures listed *    Final Anesthesia Type: epidural      Patient location during evaluation: labor & delivery  Patient participation: Yes- Able to Participate  Level of consciousness: awake and alert, awake and oriented  Post-procedure vital signs: reviewed and stable  Pain management: adequate  Airway patency: patent    PONV status at discharge: No PONV  Anesthetic complications: no      Cardiovascular status: blood pressure returned to baseline and hemodynamically stable  Respiratory status: unassisted and spontaneous ventilation  Hydration status: euvolemic  Follow-up not needed.              Vitals Value Taken Time   /77 04/18/24 1749   Temp 36.7 °C (98 °F) 04/18/24 1730   Pulse 97 04/18/24 1759   Resp 16 04/18/24 1730   SpO2 100 % 04/18/24 1759   Vitals shown include unfiled device data.      No case tracking events are documented in the log.      Pain/Vashti Score: No data recorded

## 2024-04-18 NOTE — ANESTHESIA PROCEDURE NOTES
Epidural    Patient location during procedure: OB   Reason for block: primary anesthetic   Reason for block: labor analgesia requested by patient and obstetrician  Diagnosis: IUP   Start time: 4/18/2024 12:26 PM  Timeout: 4/18/2024 12:26 PM  End time: 4/18/2024 12:34 PM  Surgery related to: Planned vaginal delivery    Staffing  Performing Provider: Tom Harris CRNA  Authorizing Provider: Shaka Benton MD    Staffing  Performed by: Tom Harris CRNA  Authorized by: Shaka Benton MD        Preanesthetic Checklist  Completed: patient identified, IV checked, site marked, risks and benefits discussed, surgical consent, monitors and equipment checked, pre-op evaluation, timeout performed, anesthesia consent given, hand hygiene performed and patient being monitored  Preparation  Patient position: sitting  Prep: Betadine  Patient monitoring: Pulse Ox and Blood Pressure  Reason for block: primary anesthetic   Epidural  Skin Anesthetic: lidocaine 1%  Skin Wheal: 3 mL  Administration type: continuous  Approach: midline  Interspace: L3-4    Injection technique: OLGA air  Needle and Epidural Catheter  Needle type: Tuohy   Needle gauge: 17  Needle length: 3.5 inches  Needle insertion depth: 6 cm  Catheter type: springwound and multi-orifice  Catheter size: 18 G  Catheter at skin depth: 12 cm  Insertion Attempts: 1  Test dose: 3 mL of lidocaine 1.5% with Epi 1-to-200,000  Additional Documentation: incremental injection, negative aspiration for heme and CSF, no paresthesia on injection, no signs/symptoms of IV or SA injection, no significant pain on injection and no significant complaints from patient  Needle localization: anatomical landmarks  Medications:  Volume per aspiration: 0 mL  Time between aspirations: 2 minutes   Assessment  Upper dermatomal levels - Left: T10  Right: T10   Dermatomal levels determined by pinch or prick  Ease of block: easy  Patient's tolerance of the procedure: comfortable  throughout block and no complaints No inadvertent dural puncture with Tuohy.  Dural puncture not performed with spinal needle

## 2024-04-18 NOTE — SUBJECTIVE & OBJECTIVE
Interval History:  Ann Marie is a 28 y.o.  at 40w0d. She is doing well. Comfortable with epidural.    Objective:     Vital Signs (Most Recent):  Temp: 98.2 °F (36.8 °C) (24 1400)  Pulse: 83 (24 1415)  Resp: 16 (24 1400)  BP: 124/78 (24 1415)  SpO2: 99 % (24 1415) Vital Signs (24h Range):  Temp:  [97.6 °F (36.4 °C)-98.7 °F (37.1 °C)] 98.2 °F (36.8 °C)  Pulse:  [65-92] 83  Resp:  [16] 16  SpO2:  [97 %-100 %] 99 %  BP: (112-139)/(66-92) 124/78     Weight: 61.2 kg (134 lb 14.7 oz)  Body mass index is 25.49 kg/m².    FHT: Cat 1 (reassuring)  TOCO:  Q 2 minutes    Cervical Exam by RN:  Dilation:  7  Effacement:  100%  Station: 0  Presentation: Vertex     Significant Labs:  Lab Results   Component Value Date    GROUPTRH B POS 2024    HEPBSAG Non-reactive 01/10/2024    STREPBCULT No Group B Streptococcus isolated 2024       Recent Lab Results         24  0216        Albumin 3.1              ALT 49       Anion Gap 15       AST 41       Baso # 0.03       Basophil % 0.4       BILIRUBIN TOTAL 0.5  Comment: For infants and newborns, interpretation of results should be based  on gestational age, weight and in agreement with clinical  observations.    Premature Infant recommended reference ranges:  Up to 24 hours.............<8.0 mg/dL  Up to 48 hours............<12.0 mg/dL  3-5 days..................<15.0 mg/dL  6-29 days.................<15.0 mg/dL         BUN 11       Calcium 9.5       Chloride 109       CO2 15       Creatinine 0.8       Differential Method Automated       eGFR >60       Eos # 0.0       Eos % 0.5       Glucose 66       Gran # (ANC) 5.0       Gran % 65.1       Group & Rh B POS       Hematocrit 37.0       Hemoglobin 13.3       Immature Grans (Abs) 0.06  Comment: Mild elevation in immature granulocytes is non specific and   can be seen in a variety of conditions including stress response,   acute inflammation, trauma and pregnancy. Correlation with other    laboratory and clinical findings is essential.         Immature Granulocytes 0.8       INDIRECT GAVI NEG       Lymph # 2.1       Lymph % 26.9       MCH 32.0       MCHC 35.9       MCV 89       Mono # 0.5       Mono % 6.3       MPV 10.3       nRBC 0       Platelet Count 191       Potassium 3.8       PROTEIN TOTAL 7.0       RBC 4.15       RDW 11.8       Sodium 139       WBC 7.73               Physical Exam:   Constitutional: She is oriented to person, place, and time. She appears well-developed and well-nourished. No distress.                           Neurological: She is alert and oriented to person, place, and time.     Psychiatric: She has a normal mood and affect. Her behavior is normal. Judgment and thought content normal.       Review of Systems

## 2024-04-18 NOTE — SUBJECTIVE & OBJECTIVE
Interval History:  Ann Marie is a 28 y.o.  at 40w0d. She is doing well. Comfortable with epidural in place.    Objective:     Vital Signs (Most Recent):  Temp: (P) 98.4 °F (36.9 °C) (24 1200)  Pulse: 69 (24 1255)  Resp: 16 (24 0905)  BP: 118/69 (24 1255)  SpO2: 100 % (24 1255) Vital Signs (24h Range):  Temp:  [98 °F (36.7 °C)-98.7 °F (37.1 °C)] (P) 98.4 °F (36.9 °C)  Pulse:  [] 69  Resp:  [16] 16  SpO2:  [97 %-100 %] 100 %  BP: (106-139)/(67-92) 118/69        There is no height or weight on file to calculate BMI.    FHT: Cat 1 (reassuring)  TOCO:  Q 2 minutes    Cervical Exam:  Dilation:  4  Effacement:  100%  Station: -2  Presentation: Vertex; AROM performed of bulging forebag and clear amniotic fluid drained; fetal head now well-applied to the cervix     Significant Labs:  Lab Results   Component Value Date    GROUPTRH B POS 2024    HEPBSAG Non-reactive 01/10/2024    STREPBCULT No Group B Streptococcus isolated 2024       I have personallly reviewed all pertinent lab results from the last 24 hours.    Physical Exam:   Constitutional: She is oriented to person, place, and time. She appears well-developed and well-nourished. No distress.                           Neurological: She is alert and oriented to person, place, and time.         Review of Systems

## 2024-04-18 NOTE — SUBJECTIVE & OBJECTIVE
Obstetric HPI:  Patient reports Frequency: Every 10 minutes contractions, active fetal movement, No vaginal bleeding , Yes loss of fluid     This pregnancy has been complicated by hypokalemia, anemia    OB History    Para Term  AB Living   1 0 0 0 0 0   SAB IAB Ectopic Multiple Live Births   0 0 0 0 0      # Outcome Date GA Lbr Jay/2nd Weight Sex Type Anes PTL Lv   1 Current              No past medical history on file.  No past surgical history on file.    Current Facility-Administered Medications   Medication Dose Route Frequency Provider Last Rate Last Admin    calcium carbonate 200 mg calcium (500 mg) chewable tablet 500 mg  500 mg Oral TID PRN Blank, Sol SADLER CNM        carboprost injection 250 mcg  250 mcg Intramuscular Q15 Min PRN BlankSol CNM        diphenoxylate-atropine 2.5-0.025 mg per tablet 2 tablet  2 tablet Oral Q6H PRN Blank, Sol SADLER CNM        lactated ringers bolus 1,000 mL  1,000 mL Intravenous PRN Blank, Sol SADLER CNM        lactated ringers bolus 500 mL  500 mL Intravenous PRN Blank, Sol SADLER CNM        lactated ringers bolus 500 mL  500 mL Intravenous Once PRN Blank, Sol SADLRE CNM        lactated ringers infusion   Intravenous Continuous BlankSol CNM        LIDOcaine (PF) 10 mg/ml (1%) injection 100 mg  10 mL Intradermal Once PRN BlankSol CNM        methylergonovine injection 200 mcg  200 mcg Intramuscular Once PRN BlankSol CNM        miSOPROStoL tablet 800 mcg  800 mcg Rectal Once PRN BlankSol CNM        miSOPROStoL tablet 800 mcg  800 mcg Oral Once PRN BlankSol CNM        ondansetron disintegrating tablet 8 mg  8 mg Oral Q8H PRN Blank, Sol SADLER CNM        oxytocin 30 units in 500 mL lactated ringers infusion (non-titrating)  334 brad-units/min Intravenous Once BlankSol CNM        oxytocin 30 units in 500 mL lactated ringers infusion (non-titrating)  95 brad-units/min Intravenous Once Blank, Sol SADLER CNM        oxytocin 30 units in 500 mL  lactated ringers infusion (non-titrating)  334 brad-units/min Intravenous Once PRN Jessi Sol SADLER ERIKARLEN        oxytocin 30 units in 500 mL lactated ringers infusion (non-titrating)  95 brad-units/min Intravenous Once PRN Blank, ERIK MarquesARLEN        oxytocin 30 units in 500 mL lactated ringers infusion (titrating)  0-32 brad-units/min Intravenous Continuous Sol Bowen CNM        oxytocin injection 10 Units  10 Units Intramuscular Once PRN Sol Bowen CNM        simethicone chewable tablet 80 mg  1 tablet Oral QID PRN Sol Bowen CNM        tranexamic acid in NaCl,iso-os IVPB 1,000 mg  1,000 mg Intravenous Q30 Min PRN Sol Bowen CNM           Review of patient's allergies indicates:   Allergen Reactions    Iodinated contrast media         Family History    None       Tobacco Use    Smoking status: Never     Passive exposure: Never    Smokeless tobacco: Never   Substance and Sexual Activity    Alcohol use: Not Currently    Drug use: Never    Sexual activity: Yes     Partners: Male     Review of Systems   Gastrointestinal:  Positive for abdominal pain.   Genitourinary:  Positive for vaginal discharge.   All other systems reviewed and are negative.     Objective:     Vital Signs (Most Recent):    Vital Signs (24h Range):  Temp:  [98.6 °F (37 °C)] 98.6 °F (37 °C)  Pulse:  [] 73  Resp:  [16] 16  SpO2:  [97 %-100 %] 97 %  BP: (106-136)/(67-84) 121/77        There is no height or weight on file to calculate BMI.    FHT: 135Cat 1 (reassuring)  TOCO:  irregular     Physical Exam:   Constitutional: She is oriented to person, place, and time. She appears well-developed and well-nourished.       Cardiovascular:  Normal rate.             Pulmonary/Chest: Effort normal. No respiratory distress.        Abdominal: Soft.     Genitourinary:    Uterus normal.   There is vaginal discharge in the vagina.    Genitourinary Comments: Grossly rupture clear fluid             Musculoskeletal: Moves all extremeties.        Neurological: She is alert and oriented to person, place, and time.    Skin: Skin is warm and dry.    Psychiatric: She has a normal mood and affect.        Cervix: declined       Significant Labs:  Lab Results   Component Value Date    GROUPTRH B POS 01/10/2024    HEPBSAG Non-reactive 01/10/2024    STREPBCULT No Group B Streptococcus isolated 03/28/2024       I have personallly reviewed all pertinent lab results from the last 24 hours.

## 2024-04-18 NOTE — ANESTHESIA PREPROCEDURE EVALUATION
2024  Ann Marie Carolina is a 28 y.o., female.      Pre-op Assessment    I have reviewed the Patient Summary Reports.     I have reviewed the Nursing Notes.    I have reviewed the Medications.     Review of Systems  Anesthesia Hx:   Neg history of prior surgery.          Denies Family Hx of Anesthesia complications.    Denies Personal Hx of Anesthesia complications.                    OB/GYN/PEDS:    Planned Vaginal Delivery                         Physical Exam  General: Well nourished, Cooperative and Alert    Airway:  Mallampati: II   Mouth Opening: Normal  TM Distance: Normal  Tongue: Normal  Neck ROM: Normal ROM    Dental:  Intact    Chest/Lungs:  Clear to auscultation, Normal Respiratory Rate    Heart:  Rate: Normal  Rhythm: Regular Rhythm  Sounds: Normal      Lab Results   Component Value Date    WBC 7.73 2024    HGB 13.3 2024    HCT 37.0 2024    MCV 89 2024     2024       OB History          1    Para        Term                AB        Living             SAB        IAB        Ectopic        Multiple        Live Births                   Patient Active Problem List   Diagnosis    Hematuria    Hypokalemia    Anemia during pregnancy in third trimester    Vaginal discharge during pregnancy in third trimester    Threatened labor at term    Amniotic fluid leaking         Anesthesia Plan  Type of Anesthesia, risks & benefits discussed:    Anesthesia Type: Epidural  Intra-op Monitoring Plan: Standard ASA Monitors  Post Op Pain Control Plan: epidural analgesia  Informed Consent: Informed consent signed with the Patient and all parties understand the risks and agree with anesthesia plan.  All questions answered.   ASA Score: 2  Day of Surgery Review of History & Physical: H&P Update referred to the surgeon/provider.    Ready For Surgery From Anesthesia  Perspective.     .

## 2024-04-18 NOTE — ASSESSMENT & PLAN NOTE
Grossly ruptured clear fluid  Admit to LD for SROM  Speculum exam performed. Positive pooling, positive fern, positive Nitrazine, cervix appears to be dilated the same as previous visit on 24  Declined VE for now.  Agreeable for VE at 0345 (4 hours after rupture). If no change is made from previous visit check then will start pitocin 2x2  Desires NCB   Anticipate

## 2024-04-18 NOTE — ASSESSMENT & PLAN NOTE
Grossly ruptured clear fluid  Admit to LD for SROM  Speculum exam performed. Positive pooling, positive fern, positive Nitrazine, cervix appears to be dilated the same as previous visit on 24  Declined VE for now.  Agreeable for VE at 0345 (4 hours after rupture). If no change is made from previous visit check then will start pitocin 2x2  Desires NCB   Anticipate    13:00: epidural in place; AROM of forebag, clear fluid; continue pitocin at 8

## 2024-04-18 NOTE — PROGRESS NOTES
[facility]  Obstetrics  Labor Progress Note    Patient Name: Ann Marie Torres-on  MRN: [id number]  Admission Date: 2024  Hospital Length of Stay: 0 days  Attending Physician: Magan Godwin MD  Primary Care Provider: Tasha, Primary Doctor    Subjective:     Principal Problem:Amniotic fluid leaking    Hospital Course:  Grossly ruptured clear fluid  Admit to LD for SROM  Speculum exam performed. Positive pooling, positive fern, positive Nitrazine, cervix appears to be dilated the same as previous visit on 24  Declined VE for now.  Agreeable for VE at 0345 (4 hours after rupture). If no change is made from previous visit check then will start pitocin 2x2  Desires NCB   Anticipate      Interval History:  Ann Marie is a 28 y.o.  at 40w0d. She is doing well. Comfortable with epidural in place.    Objective:     Vital Signs (Most Recent):  Temp: (P) 98.4 °F (36.9 °C) (24 1200)  Pulse: 69 (24 1255)  Resp: 16 (24 0905)  BP: 118/69 (24 1255)  SpO2: 100 % (24 1255) Vital Signs (24h Range):  Temp:  [98 °F (36.7 °C)-98.7 °F (37.1 °C)] (P) 98.4 °F (36.9 °C)  Pulse:  [] 69  Resp:  [16] 16  SpO2:  [97 %-100 %] 100 %  BP: (106-139)/(67-92) 118/69        There is no height or weight on file to calculate BMI.    FHT: Cat 1 (reassuring)  TOCO:  Q 2 minutes    Cervical Exam:  Dilation:  4  Effacement:  100%  Station: -2  Presentation: Vertex; AROM performed of bulging forebag and clear amniotic fluid drained; fetal head now well-applied to the cervix     Significant Labs:  Lab Results   Component Value Date    GROUPTRH B POS 2024    HEPBSAG Non-reactive 01/10/2024    STREPBCULT No Group B Streptococcus isolated 2024       I have personallly reviewed all pertinent lab results from the last 24 hours.    Physical Exam:   Constitutional: She is oriented to person, place, and time. She appears well-developed and well-nourished. No distress.                            Neurological: She is alert and oriented to person, place, and time.         Review of Systems  Assessment/Plan:     28 y.o. female  at 40w0d for:    * Amniotic fluid leaking  Grossly ruptured clear fluid  Admit to LD for SROM  Speculum exam performed. Positive pooling, positive fern, positive Nitrazine, cervix appears to be dilated the same as previous visit on 24  Declined VE for now.  Agreeable for VE at 0345 (4 hours after rupture). If no change is made from previous visit check then will start pitocin 2x2  Desires NCB   Anticipate    13:00: epidural in place; AROM of forebag, clear fluid; continue pitocin at 8    Anemia during pregnancy in third trimester  CBC on admit    Hypokalemia  Potasium on admit 3.8          Terra Bonilla MD  Obstetrics  [facility]

## 2024-04-18 NOTE — ASSESSMENT & PLAN NOTE
Grossly ruptured clear fluid  Admit to LD for SROM  Speculum exam performed. Positive pooling, positive fern, positive Nitrazine, cervix appears to be dilated the same as previous visit on 24  Declined VE for now.  Agreeable for VE at 0345 (4 hours after rupture). If no change is made from previous visit check then will start pitocin 2x2  Desires NCB   Anticipate    13:00: epidural in place; AROM of forebag, clear fluid; continue pitocin at 8  15:00: 7cm; labor progressing well; continue pit   none

## 2024-04-18 NOTE — HOSPITAL COURSE
Grossly ruptured clear fluid  Admit to LD for SROM  Speculum exam performed. Positive pooling, positive fern, positive Nitrazine, cervix appears to be dilated the same as previous visit on 24  Declined VE for now.  Agreeable for VE at 0345 (4 hours after rupture). If no change is made from previous visit check then will start pitocin 2x2  Desires NCB   Anticipate    24- PPD1, routine postpartum orders   24 PPD2, routing pp care, discharge home

## 2024-04-19 PROCEDURE — 11000001 HC ACUTE MED/SURG PRIVATE ROOM

## 2024-04-19 PROCEDURE — 25000003 PHARM REV CODE 250: Performed by: OBSTETRICS & GYNECOLOGY

## 2024-04-19 RX ADMIN — ACETAMINOPHEN 650 MG: 325 TABLET ORAL at 05:04

## 2024-04-19 RX ADMIN — IBUPROFEN 600 MG: 600 TABLET, FILM COATED ORAL at 11:04

## 2024-04-19 RX ADMIN — ACETAMINOPHEN 650 MG: 325 TABLET ORAL at 11:04

## 2024-04-19 RX ADMIN — ACETAMINOPHEN 650 MG: 325 TABLET ORAL at 12:04

## 2024-04-19 RX ADMIN — IBUPROFEN 600 MG: 600 TABLET, FILM COATED ORAL at 12:04

## 2024-04-19 RX ADMIN — IBUPROFEN 600 MG: 600 TABLET, FILM COATED ORAL at 05:04

## 2024-04-19 RX ADMIN — DOCUSATE SODIUM 100 MG: 100 CAPSULE, LIQUID FILLED ORAL at 08:04

## 2024-04-19 RX ADMIN — PRENATAL VITAMINS-IRON FUMARATE 27 MG IRON-FOLIC ACID 0.8 MG TABLET 1 TABLET: at 08:04

## 2024-04-19 NOTE — ASSESSMENT & PLAN NOTE
Grossly ruptured clear fluid  Admit to LD for SROM  Speculum exam performed. Positive pooling, positive fern, positive Nitrazine, cervix appears to be dilated the same as previous visit on 24  Declined VE for now.  Agreeable for VE at 0345 (4 hours after rupture). If no change is made from previous visit check then will start pitocin 2x2  Desires NCB   Anticipate    13:00: epidural in place; AROM of forebag, clear fluid; continue pitocin at 8  15:00: 7cm; labor progressing well; continue pit  24- PPD1, routine postpartum orders

## 2024-04-19 NOTE — PROGRESS NOTES
[facility] ([facility])  Obstetrics  Postpartum Progress Note    Patient Name: Ann Marie Carolina  MRN: [id number]  Admission Date: 2024  Hospital Length of Stay: 1 days  Attending Physician: Terra Bonilla MD  Primary Care Provider: Tasha, Primary Doctor    Subjective:     Principal Problem: (normal spontaneous vaginal delivery)    Hospital Course:  Grossly ruptured clear fluid  Admit to LD for SROM  Speculum exam performed. Positive pooling, positive fern, positive Nitrazine, cervix appears to be dilated the same as previous visit on 24  Declined VE for now.  Agreeable for VE at 0345 (4 hours after rupture). If no change is made from previous visit check then will start pitocin 2x2  Desires NCB   Anticipate    24- PPD1, routine postpartum orders     Interval History: PPD1    She is doing well this morning. C/o injury in her mouth that occurred about 2 weeks ago from her dog. She states its not painful but she bit the area while eating. It has become a nuance for her. She is tolerating a regular diet without nausea or vomiting. She is voiding spontaneously. She is ambulating. Vaginal bleeding is mild. She denies fever or chills. Abdominal pain is mild and controlled with oral medications. She Is breastfeeding. She desires circumcision for her male baby: no.     Objective:     Vital Signs (Most Recent):  Temp: 98.6 °F (37 °C) (24 0703)  Pulse: 66 (24 0703)  Resp: 12 (24 0703)  BP: 122/67 (24 0703)  SpO2: 99 % (24 0703) Vital Signs (24h Range):  Temp:  [97.6 °F (36.4 °C)-98.8 °F (37.1 °C)] 98.6 °F (37 °C)  Pulse:  [] 66  Resp:  [12-18] 12  SpO2:  [99 %-100 %] 99 %  BP: (100-135)/(66-90) 122/67     Weight: 61.2 kg (134 lb 14.7 oz)  Body mass index is 25.49 kg/m².      Intake/Output Summary (Last 24 hours) at 2024 1029  Last data filed at 2024 1830  Gross per 24 hour   Intake 8 ml   Output 1300 ml   Net -1292 ml         Significant Labs:  Lab  Results   Component Value Date    GROUPTRH B POS 2024    HEPBSAG Non-reactive 01/10/2024    STREPBCULT No Group B Streptococcus isolated 2024     Recent Labs   Lab 24  0216   HGB 13.3   HCT 37.0       I have personallly reviewed all pertinent lab results from the last 24 hours.    Physical Exam:   Constitutional: She is oriented to person, place, and time. She appears well-developed and well-nourished.    HENT:   Head: Normocephalic.    Eyes: Conjunctivae are normal.      Pulmonary/Chest: Effort normal.        Abdominal: Soft. Bowel sounds are normal.             Musculoskeletal: Normal range of motion.       Neurological: She is alert and oriented to person, place, and time.    Skin: Skin is warm and dry.    Psychiatric: She has a normal mood and affect. Her behavior is normal. Judgment and thought content normal.       Review of Systems  Assessment/Plan:     28 y.o. female  for:    *  (normal spontaneous vaginal delivery)  Grossly ruptured clear fluid  Admit to LD for SROM  Speculum exam performed. Positive pooling, positive fern, positive Nitrazine, cervix appears to be dilated the same as previous visit on 24  Declined VE for now.  Agreeable for VE at 0345 (4 hours after rupture). If no change is made from previous visit check then will start pitocin 2x2  Desires NCB   Anticipate    13:00: epidural in place; AROM of forebag, clear fluid; continue pitocin at 8  15:00: 7cm; labor progressing well; continue pit  24- PPD1, routine postpartum orders     Anemia during pregnancy in third trimester  CBC     Hypokalemia  Potasium on admit 3.8         Disposition: As patient meets milestones, will plan to discharge tomorrow.    Jayla Cantu CNM  Obstetrics  [facility] ([facility])

## 2024-04-19 NOTE — PLAN OF CARE
[facility])  Discharge Assessment    Primary Care Provider: No, Primary Doctor     OB Screen (most recent)       OB Screen - 24 0890          OB SCREEN    Assessment Type Discharge Planning Assessment     Source of Information patient;health record     Received Prenatal Care Yes     Any indications/suspicions for None     Is this a teen pregnancy No     Is the baby in NICU No     Indication for adoption/Safe Haven No     Indication for DME/post-acute needs No     HIV (+) No     Any congenital  disorders No     Fetal demise/ death No

## 2024-04-19 NOTE — LACTATION NOTE
Mother called for lactation to double check proper latching positioning/technique. Mother had infant latched and actively breastfeeding upon entering room. Mother had properly used corrections from previous lactation session and had proper hand placement and proper infant body positioning. Infant had a wide gape, nutritive sucks, audible swallows without stimulation, mother denies pain. Infant pulled off once and nipple color and shape were WNL. Pointed all of these things out to mother to reassure her that she had properly latched infant and his feeding was going well. Mother felt reassured. Denied any other lactation questions or concerns at this time.

## 2024-04-19 NOTE — LACTATION NOTE
Lactation rounds- Mother states she was just starting to try to latching infant. He has been more sleepy today after cluster feeding during the night. Output WNL. No weight loss charted due to time of birth.     Baby is showing feeding cues. Helped mother to settle in a cross cradle position on the L breast. Discussed placing infant belly to belly and rolled infant's body into mothers. Also discussed optimizing hand positioning in cross cradle position, I suggested changing hands to L hand supporting breast and R hand behind infant's back and supporting infant's neck/shoulders. Reviewed deep asymmetric latch and proper positioning. Mother able to hand express and copious colostrum dripped out. Mother is able to demonstrate back and deep latch easily obtained. Infant very sleepy and only sucks once or twice then stops despite infant stimulation and breast massage. Infant easily able to be pulled off of breast without needing to break a suction seal. After multiple latching attempts and stimulating infant, I suggest hand expressing and syringe feeding infant and trying to latch next feed since infant has gone multiple hours without a feed. Mother is agreeable.     Mother was taught hand expression of breastmilk/colostrum. She was instructed to:  Sit upright and lean forward, if possible.  When feasible, apply warm, wet compress over breasts for a few minutes.   Perform gentle breast massage.  Form a C with her hand and place it about 1 inch back from the areola with the nipple centered between her index finger and her thumb.  Press, compress, relax:  Using her finger and thumb, apply pressure in an inward direction toward the breast without stretching the tissue, compress the breast tissue between her finger and thumb, then relax her finger and thumb. Repeat process for a few minutes.  Rotate placement of finger and thumb on the breasts to facilitate emptying.  Collect expressed breastmilk/colostrum with a spoon  or cup and feed immediately to the baby, if able.  If unable to feed immediately, place breastmilk/colostrum directly into a sterile storage container for later use. Place the babys breast milk label (with the date and time of collection and the names of mother's medications) on the container. Reviewed proper handling and storage of expressed breastmilk.   Patient effectively return demonstrated and verbalized understanding.    1.2 ml of colostrum expressed and syringe fed to infant by nurse. Mother correctly verbalized steps of syringe feeding infant. Infant appears content after syringe feed. Instructed mom if infant begins showing cues she can try to latch again.     Lactation packet reviewed for days 1-2.  Discussed early feeding cues and encouraged mother to feed baby in response to those cues. Encouraged on demand feedings and skin to skin.  Reviewed normal feeding expectations of 8 or more feedings per 24 hour period, cues that babies use to signal hunger and satiety and cluster feeding. Discussed the adequacy of colostrum and baby belly size for the first 3 days of life along with expected output.     Discussed risks of introducing a pacifier or artificial nipple and discussed the AAP recommendation to avoid the use of pacifiers until 1 month of age for breastfeeding infants.     Mother states she received a Motif breast pump through insurance during her pregnancy.     Primary nurse ANDRES Gregorio updated on HE, syringe fed, and sleepy infant.     Mother verbalizes understanding of all education and counseling. Mother denies any further lactation needs or concerns at this time. Discussed lactation availability. Encouraged mother to call for assistance when needs arise.

## 2024-04-19 NOTE — SUBJECTIVE & OBJECTIVE
Interval History: PPD1    She is doing well this morning. C/o injury in her mouth that occurred about 2 weeks ago from her dog. She states its not painful but she bit the area while eating. It has become a nuance for her. She is tolerating a regular diet without nausea or vomiting. She is voiding spontaneously. She is ambulating. Vaginal bleeding is mild. She denies fever or chills. Abdominal pain is mild and controlled with oral medications. She Is breastfeeding. She desires circumcision for her male baby: no.     Objective:     Vital Signs (Most Recent):  Temp: 98.6 °F (37 °C) (04/19/24 0703)  Pulse: 66 (04/19/24 0703)  Resp: 12 (04/19/24 0703)  BP: 122/67 (04/19/24 0703)  SpO2: 99 % (04/19/24 0703) Vital Signs (24h Range):  Temp:  [97.6 °F (36.4 °C)-98.8 °F (37.1 °C)] 98.6 °F (37 °C)  Pulse:  [] 66  Resp:  [12-18] 12  SpO2:  [99 %-100 %] 99 %  BP: (100-135)/(66-90) 122/67     Weight: 61.2 kg (134 lb 14.7 oz)  Body mass index is 25.49 kg/m².      Intake/Output Summary (Last 24 hours) at 4/19/2024 1029  Last data filed at 4/18/2024 1830  Gross per 24 hour   Intake 8 ml   Output 1300 ml   Net -1292 ml         Significant Labs:  Lab Results   Component Value Date    GROUPTRH B POS 04/18/2024    HEPBSAG Non-reactive 01/10/2024    STREPBCULT No Group B Streptococcus isolated 03/28/2024     Recent Labs   Lab 04/18/24  0216   HGB 13.3   HCT 37.0       I have personallly reviewed all pertinent lab results from the last 24 hours.    Physical Exam:   Constitutional: She is oriented to person, place, and time. She appears well-developed and well-nourished.    HENT:   Head: Normocephalic.    Eyes: Conjunctivae are normal.      Pulmonary/Chest: Effort normal.        Abdominal: Soft. Bowel sounds are normal.             Musculoskeletal: Normal range of motion.       Neurological: She is alert and oriented to person, place, and time.    Skin: Skin is warm and dry.    Psychiatric: She has a normal mood and affect. Her  behavior is normal. Judgment and thought content normal.       Review of Systems

## 2024-04-20 ENCOUNTER — TELEPHONE (OUTPATIENT)
Dept: LACTATION | Facility: CLINIC | Age: 29
End: 2024-04-20
Payer: OTHER GOVERNMENT

## 2024-04-20 VITALS
TEMPERATURE: 97 F | DIASTOLIC BLOOD PRESSURE: 78 MMHG | RESPIRATION RATE: 16 BRPM | SYSTOLIC BLOOD PRESSURE: 116 MMHG | WEIGHT: 134.94 LBS | HEART RATE: 62 BPM | OXYGEN SATURATION: 98 % | BODY MASS INDEX: 25.49 KG/M2

## 2024-04-20 PROBLEM — E87.6 HYPOKALEMIA: Status: RESOLVED | Noted: 2024-01-24 | Resolved: 2024-04-20

## 2024-04-20 PROCEDURE — 25000003 PHARM REV CODE 250: Performed by: OBSTETRICS & GYNECOLOGY

## 2024-04-20 RX ORDER — IBUPROFEN 600 MG/1
600 TABLET ORAL EVERY 6 HOURS
Qty: 30 TABLET | Refills: 0 | Status: SHIPPED | OUTPATIENT
Start: 2024-04-20 | End: 2024-06-19

## 2024-04-20 RX ADMIN — DOCUSATE SODIUM 100 MG: 100 CAPSULE, LIQUID FILLED ORAL at 08:04

## 2024-04-20 RX ADMIN — ACETAMINOPHEN 650 MG: 325 TABLET ORAL at 12:04

## 2024-04-20 RX ADMIN — IBUPROFEN 600 MG: 600 TABLET, FILM COATED ORAL at 12:04

## 2024-04-20 RX ADMIN — PRENATAL VITAMINS-IRON FUMARATE 27 MG IRON-FOLIC ACID 0.8 MG TABLET 1 TABLET: at 08:04

## 2024-04-20 NOTE — PROGRESS NOTES
"[facility] ([facility])  Obstetrics  Postpartum Progress Note    Patient Name: Ann Marie Carolina  MRN: [id number]  Admission Date: 2024  Hospital Length of Stay: 2 days  Attending Physician: Terra Bonilla MD  Primary Care Provider: Tasha, Primary Doctor    Subjective:     Principal Problem: (normal spontaneous vaginal delivery)    Hospital Course:  Grossly ruptured clear fluid  Admit to LD for SROM  Speculum exam performed. Positive pooling, positive fern, positive Nitrazine, cervix appears to be dilated the same as previous visit on 24  Declined VE for now.  Agreeable for VE at 0345 (4 hours after rupture). If no change is made from previous visit check then will start pitocin 2x2  Desires NCB   Anticipate    24- PPD1, routine postpartum orders   24 PPD2, routing pp care, discharge home    Interval History:     She is doing well this morning. She is tolerating a regular diet without nausea or vomiting. She is voiding spontaneously. She is ambulating. She has passed flatus, and has not a BM. Vaginal bleeding is mild. She denies fever or chills. Abdominal pain is mild and controlled with oral medications. She Is breastfeeding. She desires circumcision for her male baby: No.    Objective:     Vital Signs (Most Recent):  Temp: 98 °F (36.7 °C) (24 0021)  Pulse: 70 (24 0021)  Resp: 18 (24 0021)  BP: 131/75 (24 0021)  SpO2: 98 % (24 0021) Vital Signs (24h Range):  Temp:  [97.9 °F (36.6 °C)-98.1 °F (36.7 °C)] 98 °F (36.7 °C)  Pulse:  [62-76] 70  Resp:  [12-18] 18  SpO2:  [98 %] 98 %  BP: (107-131)/(70-75) 131/75     Weight: 61.2 kg (134 lb 14.7 oz)  Body mass index is 25.49 kg/m².    No intake or output data in the 24 hours ending 24 0741      Significant Labs:  Lab Results   Component Value Date    GROUPTRH B POS 2024    HEPBSAG Non-reactive 01/10/2024    STREPBCULT No Group B Streptococcus isolated 2024     No results for input(s): "HGB", " ""HCT" in the last 48 hours.    I have personallly reviewed all pertinent lab results from the last 24 hours.    Physical Exam:   Constitutional: She appears well-developed and well-nourished.    HENT:   Head: Normocephalic and atraumatic.      Cardiovascular:  Normal rate.             Pulmonary/Chest: Effort normal.        Abdominal: Soft.   FFM @ u/2             Musculoskeletal: Normal range of motion and moves all extremeties.       Neurological: She is alert.    Skin: Skin is warm and dry.    Psychiatric: She has a normal mood and affect. Her behavior is normal. Judgment and thought content normal.       Review of Systems  Assessment/Plan:     28 y.o. female  for:    *  (normal spontaneous vaginal delivery)  Routine pp care, discharge home       Single live birth  Care of infant per peds    Anemia during pregnancy in third trimester  CBC         Disposition: As patient meets milestones, will plan to discharge today.    Tracee Irwin CNM  Obstetrics  [facility]      "

## 2024-04-20 NOTE — LACTATION NOTE
This note was copied from a baby's chart.  0430: After collecting feeding sheet, it was noted that infant had not had a wet diaper in >24 hr. Throughout the night, infant had been cluster feeding and continuously showing hunger cues after unlatching from the breast. Mother stated infant would only sleep for 5-10 minutes at a time before crying again and rooting at the breast. Mother appeared very sleepy and stated she has not been able to rest due to infant's cluster feeding. Mother states she had tried hand expressing but would only get 0.5-1 mL each time.     Mother was educated on breastfeeding supplementation due to infant not having a wet diaper in 24 hours. Mother agreed and requesting to syringe feed. Mother taught how to safely feed infant via this method demonstrated by RN.     Because baby is being supplemented away from the breast, mother was:   - informed that breastfeeding support and assistance is available as needed  - encouraged to express milk from both breasts each time a supplement is given  - encouraged to use her own collected milk as a first choice for supplementation    Mother was also educated on the possibility of it taking 3-5 days before full milk supply comes in and to continue stimulating breasts every 2-3 hours to promote milk supply.   Mother was encouraged to request assistance as needed and voices understanding of all teachings.    Infant syringe fed 32 mL formula. Infant now appears sleepy and relaxed.

## 2024-04-20 NOTE — SUBJECTIVE & OBJECTIVE
"Interval History:     She is doing well this morning. She is tolerating a regular diet without nausea or vomiting. She is voiding spontaneously. She is ambulating. She has passed flatus, and has not a BM. Vaginal bleeding is mild. She denies fever or chills. Abdominal pain is mild and controlled with oral medications. She Is breastfeeding. She desires circumcision for her male baby: No.    Objective:     Vital Signs (Most Recent):  Temp: 98 °F (36.7 °C) (04/20/24 0021)  Pulse: 70 (04/20/24 0021)  Resp: 18 (04/20/24 0021)  BP: 131/75 (04/20/24 0021)  SpO2: 98 % (04/20/24 0021) Vital Signs (24h Range):  Temp:  [97.9 °F (36.6 °C)-98.1 °F (36.7 °C)] 98 °F (36.7 °C)  Pulse:  [62-76] 70  Resp:  [12-18] 18  SpO2:  [98 %] 98 %  BP: (107-131)/(70-75) 131/75     Weight: 61.2 kg (134 lb 14.7 oz)  Body mass index is 25.49 kg/m².    No intake or output data in the 24 hours ending 04/20/24 0741      Significant Labs:  Lab Results   Component Value Date    GROUPTRH B POS 04/18/2024    HEPBSAG Non-reactive 01/10/2024    STREPBCULT No Group B Streptococcus isolated 03/28/2024     No results for input(s): "HGB", "HCT" in the last 48 hours.    I have personallly reviewed all pertinent lab results from the last 24 hours.    Physical Exam:   Constitutional: She appears well-developed and well-nourished.    HENT:   Head: Normocephalic and atraumatic.      Cardiovascular:  Normal rate.             Pulmonary/Chest: Effort normal.        Abdominal: Soft.   FFM @ u/2             Musculoskeletal: Normal range of motion and moves all extremeties.       Neurological: She is alert.    Skin: Skin is warm and dry.    Psychiatric: She has a normal mood and affect. Her behavior is normal. Judgment and thought content normal.       Review of Systems  "

## 2024-04-20 NOTE — DISCHARGE INSTRUCTIONS
"Mother Self Care:    Activity: Avoid strenuous exercise and get adequate rest.  No driving until the physician consent given.  Emotional Changes: Most women find birth to be a time of great emotional upheaval.  Sense of loss, mood swings, fatigue, anxiety, and feeling "let down" are common.  If feelings worsen or last more than a week, call your physician.  Breast Care/Breastfeeding: Wear a bra for comfort.  Keep nipples dry and apply your own breast milk or lanolin cream as needed for soreness.  Engorgement can be relieved with warm, moist heat before feedings.  You may also take Ibuprofen.  Tawnya-Care/Vaginal Bleeding: Remember to use your tawnya-bottle after urinating.  Your flow will change from red, to pink, to yellow/white color over a period of 2 weeks.  Menstruation will return in 3-8 weeks, or longer if breastfeeding.  Episiotomy Vaginal Delivery: Stitches will dissolve within 10 days to 3 weeks.  Warm baths, tucks, and dermoplast will promote healing.  Avoid bubble baths or strong soaps.  Sexual Activity/Pelvic Rest: No sexual activity, tampons, or douching until your physician gives you consent.  Diet: Continue to eat from the five basic food groups, including plenty of protein, fruits, vegetables, and whole grains.  Limit empty calories and high fat foods.  Drink enough fluids to satisfy thirst and add an extra 500 calories for breastfeeding.  Constipation/Hemorrhoids: Drink plenty of water.  You may take a stool softener or natural laxative (Metamucil). You may use tucks or hemorrhoid ointment and soak in a warm tub.    "What does help look like to you when you go home?"  "Is there any need that you anticipate that we may be able to assist with?"    CALL YOUR OB DOCTOR IF ANY OF THE FOLLOWING OCCURS:  *Heavy bleeding - saturating a pad an hour or passing any large (2-3 inches in size) blood clots.  *Any pain, redness, or tenderness in lower leg.  *You cannot care for yourself or your baby.  *Any signs of " infection-      - Temperature greater than 100.5 degrees F      - Foul smelling vaginal discharge and/or incisional drainage      - Increased episiotomy or incisional pain      - Hot, hard, red or sore area on breast      - Flu-like symptoms      - Any urgency, frequency or burning with urination    Return To the Hospital for further Evaluation:  Headache not relieved by tylenol or ibuprofen  Blurry vision, double vision, seeing spots, or flashing lights  Feeling faint or passing out  Right epigastric pain  Difficulty breathing  Swelling in hands, face, or feet  Any of these symptoms accompanied by nausea/vomiting  Gaining more than 5 pounds in one week  Seizures  These symptoms could be an indication of elevated blood pressure.     For patients that were treated for high blood pressure during pregnancy and or your hospital stay you will need a blood pressure check three days after you leave the hospital. Your nursing staff will assist you in an appointment if needed. If you have Connected Mom you may use your blood pressure cuff and report any readings 140/90 to your provider immediately.       If you have any questions that need to be answered immediately please call the Labor & Delivery Unit at [phone] and ask to speak to a nurse.      Please see [facility] folder for additional information and handouts.

## 2024-04-26 NOTE — DISCHARGE SUMMARY
"[facility] ([facility])  Obstetrics  Discharge Summary      Patient Name: Ann Marie Carolina  MRN: [id number]  Admission Date: 2024  Hospital Length of Stay: 2 days  Discharge Date and Time:  24  Attending Physician: Tasha att. providers found   Discharging Provider: Tracee Irwin CNM   Primary Care Provider: Tasha, Primary Doctor    HPI: 28 y.o.  40w0d with c/o water breaking "for real this time" at 2345 clear           * No surgery found *     Hospital Course:   Grossly ruptured clear fluid  Admit to LD for SROM  Speculum exam performed. Positive pooling, positive fern, positive Nitrazine, cervix appears to be dilated the same as previous visit on 24  Declined VE for now.  Agreeable for VE at 0345 (4 hours after rupture). If no change is made from previous visit check then will start pitocin 2x2  Desires NCB   Anticipate    24- PPD1, routine postpartum orders   24 PPD2, routing pp care, discharge home         Final Active Diagnoses:    Diagnosis Date Noted POA    PRINCIPAL PROBLEM:   (normal spontaneous vaginal delivery) [O80] 2024 Not Applicable    Single live birth [Z37.0] 2024 Not Applicable    Anemia during pregnancy in third trimester [O99.013] 2024 Yes      Problems Resolved During this Admission:    Diagnosis Date Noted Date Resolved POA    Hypokalemia [E87.6] 2024 Yes        Significant Diagnostic Studies: Labs: All labs within the past 24 hours have been reviewed      Feeding Method: bottle    Immunizations       Date Immunization Status Dose Route/Site Given by    24 1258 MMR Deleted 0.5 mL Subcutaneous/     24 1258 Tdap Deleted 0.5 mL Intramuscular/             Delivery:    Episiotomy: None   Lacerations: 2nd   Repair suture:     Repair # of packets: 2   Blood loss (ml):       Birth information:  YOB: 2022   Time of birth: 5:16 PM   Sex: male   Delivery type: Vaginal, Spontaneous   Gestational Age: 40w0d " "    Measurements    Weight: 3560 g  Weight (lbs): 7 lb 13.6 oz  Length: 52.1 cm  Length (in): 20.5"  Head circumference: 34.9 cm  Chest circumference: 33.7 cm  Abdominal girth: 31.1 cm         Delivery Clinician: Delivery Providers    Delivering clinician: Terra Bonilla MD   Provider Role    Swati Tobin CNM Midwife    L[place]as, Sarah, RN Registered Nurse    Melida Silveira RN Registered Nurse    [facility]             Additional  information:  Forceps:    Vacuum:    Breech:    Observed anomalies      Living?:     Apgars    Living status: Living  Apgar Component Scores:  1 min.:  5 min.:  10 min.:  15 min.:  20 min.:    Skin color:  1  1       Heart rate:  2  2       Reflex irritability:  2  2       Muscle tone:  2  2       Respiratory effort:  2  2       Total:  9  9       Apgars assigned by: ELY SILVEIRA RN         Placenta: Delivered:       appearance  Pending Diagnostic Studies:       None            Discharged Condition: good    Disposition: Home or Self Care    Follow Up:   Follow-up Information       Deidra Rayo CNM Follow up on 2024.    Specialty: Obstetrics and Gynecology  Why: routine PP visit  Contact information:  [address] [place] [address] [address] Jabier HERNANDEZ [id number] [phone]                           Patient Instructions:      BREAST PUMP FOR HOME USE     Order Specific Question Answer Comments   Type of pump: Hospital grade electric    Weight: 61.2 kg (134 lb 14.7 oz)    Length of need (1-99 months): 99      Diet Adult Regular     Pelvic Rest   Order Comments: 6 weeks postpartum     Notify your health care provider if you experience any of the following:  temperature >100.4     Notify your health care provider if you experience any of the following:  persistent nausea and vomiting or diarrhea     Notify your health care provider if you experience any of the following:  severe uncontrolled pain     Notify your health care provider if you experience any of the " following:  difficulty breathing or increased cough     Notify your health care provider if you experience any of the following:  severe persistent headache     Notify your health care provider if you experience any of the following:  persistent dizziness, light-headedness, or visual disturbances     Notify your health care provider if you experience any of the following:  increased confusion or weakness     Notify your health care provider if you experience any of the following:   Order Comments: Heavy vaginal bleeding with clots     Activity as tolerated     Medications:  Discharge Medication List as of 4/20/2024  9:33 AM        START taking these medications    Details   ibuprofen (ADVIL,MOTRIN) 600 MG tablet Take 1 tablet (600 mg total) by mouth every 6 (six) hours., Starting Sat 4/20/2024, Normal           CONTINUE these medications which have NOT CHANGED    Details   PNV no.95/ferrous fum/folic ac (PRENATAL ORAL) Take by mouth., Historical Med           STOP taking these medications       ferrous sulfate (FEOSOL) 325 mg (65 mg iron) Tab tablet Comments:   Reason for Stopping:               Tracee Irwin CNM  Obstetrics  [facility] ([facility])

## 2024-05-20 ENCOUNTER — POSTPARTUM VISIT (OUTPATIENT)
Dept: OBSTETRICS AND GYNECOLOGY | Facility: CLINIC | Age: 29
End: 2024-05-20
Payer: OTHER GOVERNMENT

## 2024-05-20 VITALS
SYSTOLIC BLOOD PRESSURE: 110 MMHG | WEIGHT: 115.31 LBS | HEIGHT: 61 IN | BODY MASS INDEX: 21.77 KG/M2 | DIASTOLIC BLOOD PRESSURE: 62 MMHG

## 2024-05-20 DIAGNOSIS — Z12.4 SCREENING FOR CERVICAL CANCER: Primary | ICD-10-CM

## 2024-05-20 DIAGNOSIS — Z30.011 ENCOUNTER FOR INITIAL PRESCRIPTION OF CONTRACEPTIVE PILLS: ICD-10-CM

## 2024-05-20 PROCEDURE — 88175 CYTOPATH C/V AUTO FLUID REDO: CPT | Performed by: ADVANCED PRACTICE MIDWIFE

## 2024-05-20 PROCEDURE — 99213 OFFICE O/P EST LOW 20 MIN: CPT | Mod: PBBFAC | Performed by: ADVANCED PRACTICE MIDWIFE

## 2024-05-20 PROCEDURE — 0503F POSTPARTUM CARE VISIT: CPT | Mod: ,,, | Performed by: ADVANCED PRACTICE MIDWIFE

## 2024-05-20 PROCEDURE — 99999 PR PBB SHADOW E&M-EST. PATIENT-LVL III: CPT | Mod: PBBFAC,,, | Performed by: ADVANCED PRACTICE MIDWIFE

## 2024-05-20 RX ORDER — NORETHINDRONE 0.35 MG/1
1 TABLET ORAL DAILY
Qty: 30 TABLET | Refills: 11 | Status: SHIPPED | OUTPATIENT
Start: 2024-05-20 | End: 2024-06-07 | Stop reason: SDUPTHER

## 2024-05-20 RX ORDER — SERTRALINE HYDROCHLORIDE 25 MG/1
25 TABLET, FILM COATED ORAL DAILY
Qty: 30 TABLET | Refills: 11 | Status: SHIPPED | OUTPATIENT
Start: 2024-05-20 | End: 2024-06-07 | Stop reason: SDUPTHER

## 2024-05-21 NOTE — PROGRESS NOTES
"CC: Post-partum follow-up    Ann Marie Carolina is a 28 y.o. female  who presents for post-partum visit.  She is S/P a .   No pain.  No fever.   No bowel / bladder complaints. Reports baby had colic and these past few weeks have been difficult but that he is doing better.    Delivery Date: 2022  Delivering provider: Terra Bonilla MD  Gender: male  Birth Weight: 7 pounds 13 ounces  Breast Feeding: YES  Depression: YES [place]  depression scale 19/30   Reports due to the baby having colic, she hasn't been getting a lot of sleep. Feels alone/isolated even though she has great support. Increased anger during stressful times, has had some intrusive thoughts but no current SI/HI. She shared with her  what she's experiencing and her in laws came down for 2 weeks to help and she's starting to feel a lot better. Open to starting on medication.  Contraception: oral progesterone-only contraceptive    Pregnancy was complicated by:  anemia    /62   Ht 5' 1" (1.549 m)   Wt 52.3 kg (115 lb 4.8 oz)   Breastfeeding Yes Comment: bottle feeding also  BMI 21.79 kg/m²     ROS:  GENERAL: No fever, chills, fatigability.  VULVAR: No pain, no lesions and no itching.  VAGINAL: No relaxation, no itching, no discharge, no abnormal bleeding and no lesions.  ABDOMEN: No abdominal pain. Denies nausea. Denies vomiting. No diarrhea. No constipation  BREAST: Denies pain. No lumps. No discharge.  URINARY: No incontinence, no nocturia, no frequency and no dysuria.  CARDIOVASCULAR: No chest pain. No shortness of breath. No leg cramps.  NEUROLOGICAL: No headaches. No vision changes.    PHYSICAL EXAM:  ABDOMEN:  Soft, non-tender, non-distended  VULVA:  Normal, no lesions  Perineal laceration intact and healed  CERVIX:  Without lesions, polyps or tenderness.  UTERUS:  Normal size, shape, consistency, no mass or tenderness.  ADNEXA:  Normal in size without mass or tenderness    Assessment/Plan:      1. " Screening for cervical cancer  - Liquid-Based Pap Smear, Screening    2. Postpartum depression  - sertraline (ZOLOFT) 25 MG tablet; Take 1 tablet (25 mg total) by mouth once daily.  Dispense: 30 tablet; Refill: 11  Discussed warning signs and reasons to go to ER/call 911    3. Postpartum examination following vaginal delivery  Doing well S/P   Instructions / precautions reviewed  May resume normal activities    4. Encounter for initial prescription of contraceptive pills  - norethindrone (MICRONOR) 0.35 mg tablet; Take 1 tablet (0.35 mg total) by mouth once daily.  Dispense: 30 tablet; Refill: 11  Stressed importance to take at the same time everyday within 2 hour window  Recommend switching to combined OCP after she finishing breastfeeding      Will f/u 2 weeks with virtual visit

## 2024-06-04 ENCOUNTER — TELEPHONE (OUTPATIENT)
Dept: OBSTETRICS AND GYNECOLOGY | Facility: CLINIC | Age: 29
End: 2024-06-04
Payer: OTHER GOVERNMENT

## 2024-06-04 NOTE — TELEPHONE ENCOUNTER
Returned patient call. Scheduled pt for in person PPD f/u with Deidra. Pt verbalized understanding and agreed to appointment.

## 2024-06-07 DIAGNOSIS — Z30.011 ENCOUNTER FOR INITIAL PRESCRIPTION OF CONTRACEPTIVE PILLS: ICD-10-CM

## 2024-06-07 RX ORDER — NORETHINDRONE 0.35 MG/1
1 TABLET ORAL DAILY
Qty: 30 TABLET | Refills: 11 | Status: SHIPPED | OUTPATIENT
Start: 2024-06-07 | End: 2025-06-07

## 2024-06-07 RX ORDER — SERTRALINE HYDROCHLORIDE 25 MG/1
25 TABLET, FILM COATED ORAL DAILY
Qty: 30 TABLET | Refills: 11 | Status: SHIPPED | OUTPATIENT
Start: 2024-06-07 | End: 2025-06-07

## 2024-06-19 ENCOUNTER — POSTPARTUM VISIT (OUTPATIENT)
Dept: OBSTETRICS AND GYNECOLOGY | Facility: CLINIC | Age: 29
End: 2024-06-19
Payer: OTHER GOVERNMENT

## 2024-06-19 VITALS
DIASTOLIC BLOOD PRESSURE: 80 MMHG | WEIGHT: 111.31 LBS | BODY MASS INDEX: 21.02 KG/M2 | SYSTOLIC BLOOD PRESSURE: 112 MMHG | HEIGHT: 61 IN

## 2024-06-19 PROBLEM — R31.9 HEMATURIA: Status: RESOLVED | Noted: 2023-11-14 | Resolved: 2024-06-19

## 2024-06-19 PROBLEM — O99.013 ANEMIA DURING PREGNANCY IN THIRD TRIMESTER: Status: RESOLVED | Noted: 2024-01-24 | Resolved: 2024-06-19

## 2024-06-19 PROBLEM — N89.8 VAGINAL DISCHARGE DURING PREGNANCY IN THIRD TRIMESTER: Status: RESOLVED | Noted: 2024-04-17 | Resolved: 2024-06-19

## 2024-06-19 PROBLEM — O47.9 THREATENED LABOR AT TERM: Status: RESOLVED | Noted: 2024-04-17 | Resolved: 2024-06-19

## 2024-06-19 PROBLEM — O26.893 VAGINAL DISCHARGE DURING PREGNANCY IN THIRD TRIMESTER: Status: RESOLVED | Noted: 2024-04-17 | Resolved: 2024-06-19

## 2024-06-19 PROCEDURE — 99999 PR PBB SHADOW E&M-EST. PATIENT-LVL III: CPT | Mod: PBBFAC,,, | Performed by: MIDWIFE

## 2024-06-19 PROCEDURE — 99213 OFFICE O/P EST LOW 20 MIN: CPT | Mod: PBBFAC | Performed by: MIDWIFE

## 2024-06-19 PROCEDURE — 0503F POSTPARTUM CARE VISIT: CPT | Mod: ,,, | Performed by: MIDWIFE

## 2024-06-20 NOTE — PROGRESS NOTES
"CC: Post-partum follow-up    Ann Marie Carolina is a 28 y.o. female  who presents for post-partum visit.  She is S/P a .  She and the baby are doing well.  No pain.  No fever.   No bowel / bladder complaints.    Delivery Date: 24  Delivery MD: CHICHI Bonilla  Gender: male  Breast Feeding: YES  Depression: NO  Contraception: oral progesterone-only contraceptive    Pregnancy was complicated by:  none    /80 (BP Location: Left arm, Patient Position: Sitting)   Ht 5' 1" (1.549 m)   Wt 50.5 kg (111 lb 5.3 oz)   Breastfeeding Yes   BMI 21.04 kg/m²     ROS:  GENERAL: No fever, chills, fatigability.  VULVAR: No pain, no lesions and no itching.  VAGINAL: No relaxation, no itching, no discharge, no abnormal bleeding and no lesions.  ABDOMEN: No abdominal pain. Denies nausea. Denies vomiting. No diarrhea. No constipation  BREAST: Denies pain. No lumps.  URINARY: No incontinence, no nocturia, no frequency and no dysuria.  CARDIOVASCULAR: No chest pain. No shortness of breath. No leg cramps.  NEUROLOGICAL: No headaches. No vision changes.    PHYSICAL EXAM:  Exam chaperoned by nurse  ABDOMEN:  Soft, non-tender, non-distended  VULVA:  Normal, no lesions  CERVIX:  Without lesions, polyps or tenderness.  UTERUS:  Normal size, shape, consistency, no mass or tenderness.  ADNEXA:  Normal in size without mass or tenderness    IMP:  Doing well S/P   Instructions / precautions reviewed  Contraceptive counseling    Rx already has [employer]    PLAN:  May resume normal activities  Return: 1 year  Wants to try getting off of zoloft, reviewed tapering  Call when weaning to change to [facility]RIEN    "

## 2024-11-20 ENCOUNTER — PATIENT MESSAGE (OUTPATIENT)
Dept: OBSTETRICS AND GYNECOLOGY | Facility: CLINIC | Age: 29
End: 2024-11-20
Payer: OTHER GOVERNMENT

## 2025-06-27 DIAGNOSIS — Z30.011 ENCOUNTER FOR INITIAL PRESCRIPTION OF CONTRACEPTIVE PILLS: ICD-10-CM

## 2025-07-01 RX ORDER — NORETHINDRONE 0.35 MG/1
1 TABLET ORAL DAILY
Qty: 30 TABLET | Refills: 11 | OUTPATIENT
Start: 2025-07-01 | End: 2026-07-01